# Patient Record
Sex: MALE | Race: WHITE | NOT HISPANIC OR LATINO | Employment: OTHER | URBAN - METROPOLITAN AREA
[De-identification: names, ages, dates, MRNs, and addresses within clinical notes are randomized per-mention and may not be internally consistent; named-entity substitution may affect disease eponyms.]

---

## 2018-07-09 DIAGNOSIS — G31.84 MILD COGNITIVE IMPAIRMENT: Primary | ICD-10-CM

## 2018-07-09 DIAGNOSIS — G31.84 MCI (MILD COGNITIVE IMPAIRMENT) WITH MEMORY LOSS: Primary | ICD-10-CM

## 2018-08-29 ENCOUNTER — HOSPITAL ENCOUNTER (OUTPATIENT)
Dept: RADIOLOGY | Facility: HOSPITAL | Age: 73
Discharge: HOME | End: 2018-08-29
Attending: FAMILY MEDICINE
Payer: MEDICARE

## 2018-08-29 DIAGNOSIS — G31.84 MCI (MILD COGNITIVE IMPAIRMENT) WITH MEMORY LOSS: ICD-10-CM

## 2018-08-29 PROCEDURE — 70470 CT HEAD/BRAIN W/O & W/DYE: CPT | Mod: TC,PO

## 2018-08-29 PROCEDURE — 25500020 PHARM REV CODE 255: Mod: PO

## 2018-08-29 RX ADMIN — IOHEXOL 75 ML: 350 INJECTION, SOLUTION INTRAVENOUS at 02:08

## 2018-12-01 ENCOUNTER — HOSPITAL ENCOUNTER (INPATIENT)
Facility: HOSPITAL | Age: 73
LOS: 1 days | Discharge: HOME | DRG: 065 | End: 2018-12-02
Attending: EMERGENCY MEDICINE | Admitting: PSYCHIATRY & NEUROLOGY
Payer: MEDICARE

## 2018-12-01 ENCOUNTER — HOSPITAL ENCOUNTER (EMERGENCY)
Facility: HOSPITAL | Age: 73
Discharge: STILL ADMITTED OR ADMINISTRATIVE | End: 2018-12-01
Attending: EMERGENCY MEDICINE
Payer: MEDICARE

## 2018-12-01 VITALS
WEIGHT: 188.94 LBS | SYSTOLIC BLOOD PRESSURE: 157 MMHG | BODY MASS INDEX: 25.59 KG/M2 | HEART RATE: 105 BPM | DIASTOLIC BLOOD PRESSURE: 83 MMHG | OXYGEN SATURATION: 97 % | HEIGHT: 72 IN | RESPIRATION RATE: 26 BRPM

## 2018-12-01 DIAGNOSIS — I63.312 CEREBROVASCULAR ACCIDENT (CVA) DUE TO THROMBOSIS OF LEFT MIDDLE CEREBRAL ARTERY: Primary | ICD-10-CM

## 2018-12-01 DIAGNOSIS — I63.9 STROKE: ICD-10-CM

## 2018-12-01 DIAGNOSIS — I63.9 CVA (CEREBRAL VASCULAR ACCIDENT): ICD-10-CM

## 2018-12-01 DIAGNOSIS — I63.9 CEREBROVASCULAR ACCIDENT (CVA), UNSPECIFIED MECHANISM: Primary | ICD-10-CM

## 2018-12-01 PROBLEM — E66.9 OBESITY, UNSPECIFIED: Status: ACTIVE | Noted: 2018-12-01

## 2018-12-01 PROBLEM — I63.412 STROKE DUE TO EMBOLISM OF LEFT MIDDLE CEREBRAL ARTERY: Status: ACTIVE | Noted: 2018-12-01

## 2018-12-01 PROBLEM — E11.9 TYPE 2 DIABETES MELLITUS, WITH LONG-TERM CURRENT USE OF INSULIN: Status: ACTIVE | Noted: 2018-12-01

## 2018-12-01 PROBLEM — Z79.4 TYPE 2 DIABETES MELLITUS, WITH LONG-TERM CURRENT USE OF INSULIN: Status: ACTIVE | Noted: 2018-12-01

## 2018-12-01 PROBLEM — Z92.82 TISSUE PLASMINOGEN ACTIVATOR (T-PA) ADMINISTERED AT OTHER FACILITY WITHIN 24 HOURS PRIOR TO CURRENT ADMISSION: Status: ACTIVE | Noted: 2018-12-01

## 2018-12-01 LAB
ALBUMIN SERPL BCP-MCNC: 4 G/DL
ALP SERPL-CCNC: 50 U/L
ALT SERPL W/O P-5'-P-CCNC: 21 U/L
ANION GAP SERPL CALC-SCNC: 8 MMOL/L
AST SERPL-CCNC: 24 U/L
BASOPHILS # BLD AUTO: 0.04 K/UL
BASOPHILS NFR BLD: 0.6 %
BILIRUB SERPL-MCNC: 0.7 MG/DL
BUN SERPL-MCNC: 18 MG/DL
CALCIUM SERPL-MCNC: 9.3 MG/DL
CHLORIDE SERPL-SCNC: 106 MMOL/L
CHOLEST SERPL-MCNC: 172 MG/DL
CHOLEST SERPL-MCNC: 189 MG/DL
CHOLEST/HDLC SERPL: 4.4 {RATIO}
CHOLEST/HDLC SERPL: 4.6 {RATIO}
CO2 SERPL-SCNC: 26 MMOL/L
CREAT SERPL-MCNC: 1.1 MG/DL
DIFFERENTIAL METHOD: NORMAL
EOSINOPHIL # BLD AUTO: 0.1 K/UL
EOSINOPHIL NFR BLD: 2.2 %
ERYTHROCYTE [DISTWIDTH] IN BLOOD BY AUTOMATED COUNT: 13.1 %
EST. GFR  (AFRICAN AMERICAN): >60 ML/MIN/1.73 M^2
EST. GFR  (NON AFRICAN AMERICAN): >60 ML/MIN/1.73 M^2
ESTIMATED AVG GLUCOSE: 212 MG/DL
GLUCOSE SERPL-MCNC: 149 MG/DL
HBA1C MFR BLD HPLC: 9 %
HCT VFR BLD AUTO: 43.4 %
HDLC SERPL-MCNC: 39 MG/DL
HDLC SERPL-MCNC: 41 MG/DL
HDLC SERPL: 21.7 %
HDLC SERPL: 22.7 %
HGB BLD-MCNC: 15 G/DL
INR PPP: 1.1
LDLC SERPL CALC-MCNC: 104.8 MG/DL
LDLC SERPL CALC-MCNC: 118.2 MG/DL
LYMPHOCYTES # BLD AUTO: 1.8 K/UL
LYMPHOCYTES NFR BLD: 27.4 %
MCH RBC QN AUTO: 30 PG
MCHC RBC AUTO-ENTMCNC: 34.6 G/DL
MCV RBC AUTO: 87 FL
MONOCYTES # BLD AUTO: 0.8 K/UL
MONOCYTES NFR BLD: 12.5 %
NEUTROPHILS # BLD AUTO: 3.7 K/UL
NEUTROPHILS NFR BLD: 57.3 %
NONHDLC SERPL-MCNC: 133 MG/DL
NONHDLC SERPL-MCNC: 148 MG/DL
PLATELET # BLD AUTO: 196 K/UL
PMV BLD AUTO: 11 FL
POCT GLUCOSE: 95 MG/DL (ref 70–110)
POCT GLUCOSE: 97 MG/DL (ref 70–110)
POTASSIUM SERPL-SCNC: 4.4 MMOL/L
PROT SERPL-MCNC: 6.8 G/DL
PROTHROMBIN TIME: 12.1 SEC
RBC # BLD AUTO: 5 M/UL
SODIUM SERPL-SCNC: 140 MMOL/L
TRIGL SERPL-MCNC: 141 MG/DL
TRIGL SERPL-MCNC: 149 MG/DL
TSH SERPL DL<=0.005 MIU/L-ACNC: 1.44 UIU/ML
TSH SERPL DL<=0.005 MIU/L-ACNC: 2.41 UIU/ML
WBC # BLD AUTO: 6.46 K/UL

## 2018-12-01 PROCEDURE — 96374 THER/PROPH/DIAG INJ IV PUSH: CPT

## 2018-12-01 PROCEDURE — 99223 1ST HOSP IP/OBS HIGH 75: CPT | Mod: ,,, | Performed by: PSYCHIATRY & NEUROLOGY

## 2018-12-01 PROCEDURE — 99285 EMERGENCY DEPT VISIT HI MDM: CPT | Mod: 25

## 2018-12-01 PROCEDURE — 99291 CRITICAL CARE FIRST HOUR: CPT | Mod: 25

## 2018-12-01 PROCEDURE — 82962 GLUCOSE BLOOD TEST: CPT

## 2018-12-01 PROCEDURE — 94760 N-INVAS EAR/PLS OXIMETRY 1: CPT

## 2018-12-01 PROCEDURE — 85610 PROTHROMBIN TIME: CPT

## 2018-12-01 PROCEDURE — 85025 COMPLETE CBC W/AUTO DIFF WBC: CPT

## 2018-12-01 PROCEDURE — 93010 ELECTROCARDIOGRAM REPORT: CPT | Mod: ,,, | Performed by: INTERNAL MEDICINE

## 2018-12-01 PROCEDURE — 93005 ELECTROCARDIOGRAM TRACING: CPT

## 2018-12-01 PROCEDURE — 83036 HEMOGLOBIN GLYCOSYLATED A1C: CPT

## 2018-12-01 PROCEDURE — 63600175 PHARM REV CODE 636 W HCPCS: Performed by: NURSE PRACTITIONER

## 2018-12-01 PROCEDURE — 96375 TX/PRO/DX INJ NEW DRUG ADDON: CPT

## 2018-12-01 PROCEDURE — 20000000 HC ICU ROOM

## 2018-12-01 PROCEDURE — 80053 COMPREHEN METABOLIC PANEL: CPT

## 2018-12-01 PROCEDURE — 25000003 PHARM REV CODE 250: Performed by: EMERGENCY MEDICINE

## 2018-12-01 PROCEDURE — 25500020 PHARM REV CODE 255: Performed by: EMERGENCY MEDICINE

## 2018-12-01 PROCEDURE — 63600175 PHARM REV CODE 636 W HCPCS: Performed by: EMERGENCY MEDICINE

## 2018-12-01 PROCEDURE — 86850 RBC ANTIBODY SCREEN: CPT

## 2018-12-01 PROCEDURE — 80061 LIPID PANEL: CPT

## 2018-12-01 PROCEDURE — 99291 CRITICAL CARE FIRST HOUR: CPT | Mod: ,,, | Performed by: EMERGENCY MEDICINE

## 2018-12-01 PROCEDURE — G0427 INPT/ED TELECONSULT70: HCPCS | Mod: GT,,, | Performed by: PSYCHIATRY & NEUROLOGY

## 2018-12-01 PROCEDURE — 80061 LIPID PANEL: CPT | Mod: 91

## 2018-12-01 PROCEDURE — 84443 ASSAY THYROID STIM HORMONE: CPT | Mod: 91

## 2018-12-01 PROCEDURE — 25000003 PHARM REV CODE 250: Performed by: NURSE PRACTITIONER

## 2018-12-01 PROCEDURE — 84443 ASSAY THYROID STIM HORMONE: CPT

## 2018-12-01 RX ORDER — GLUCAGON 1 MG
1 KIT INJECTION
Status: DISCONTINUED | OUTPATIENT
Start: 2018-12-01 | End: 2018-12-02 | Stop reason: HOSPADM

## 2018-12-01 RX ORDER — IBUPROFEN 200 MG
16 TABLET ORAL
Status: DISCONTINUED | OUTPATIENT
Start: 2018-12-01 | End: 2018-12-02 | Stop reason: HOSPADM

## 2018-12-01 RX ORDER — AMOXICILLIN 250 MG
1 CAPSULE ORAL DAILY
Status: DISCONTINUED | OUTPATIENT
Start: 2018-12-02 | End: 2018-12-02 | Stop reason: HOSPADM

## 2018-12-01 RX ORDER — SODIUM CHLORIDE 0.9 % (FLUSH) 0.9 %
3 SYRINGE (ML) INJECTION EVERY 8 HOURS
Status: DISCONTINUED | OUTPATIENT
Start: 2018-12-01 | End: 2018-12-02 | Stop reason: HOSPADM

## 2018-12-01 RX ORDER — ATORVASTATIN CALCIUM 20 MG/1
40 TABLET, FILM COATED ORAL DAILY
Status: DISCONTINUED | OUTPATIENT
Start: 2018-12-02 | End: 2018-12-02 | Stop reason: HOSPADM

## 2018-12-01 RX ORDER — HYDRALAZINE HYDROCHLORIDE 20 MG/ML
10 INJECTION INTRAMUSCULAR; INTRAVENOUS
Status: COMPLETED | OUTPATIENT
Start: 2018-12-01 | End: 2018-12-01

## 2018-12-01 RX ORDER — ACETAMINOPHEN 325 MG/1
650 TABLET ORAL EVERY 6 HOURS PRN
Status: DISCONTINUED | OUTPATIENT
Start: 2018-12-01 | End: 2018-12-02 | Stop reason: HOSPADM

## 2018-12-01 RX ORDER — IBUPROFEN 200 MG
24 TABLET ORAL
Status: DISCONTINUED | OUTPATIENT
Start: 2018-12-01 | End: 2018-12-02 | Stop reason: HOSPADM

## 2018-12-01 RX ORDER — SODIUM CHLORIDE 9 MG/ML
INJECTION, SOLUTION INTRAVENOUS CONTINUOUS
Status: DISCONTINUED | OUTPATIENT
Start: 2018-12-01 | End: 2018-12-02

## 2018-12-01 RX ORDER — HYDRALAZINE HYDROCHLORIDE 20 MG/ML
10 INJECTION INTRAMUSCULAR; INTRAVENOUS EVERY 4 HOURS PRN
Status: DISCONTINUED | OUTPATIENT
Start: 2018-12-01 | End: 2018-12-02 | Stop reason: HOSPADM

## 2018-12-01 RX ORDER — INSULIN ASPART 100 [IU]/ML
1-10 INJECTION, SOLUTION INTRAVENOUS; SUBCUTANEOUS
Status: DISCONTINUED | OUTPATIENT
Start: 2018-12-01 | End: 2018-12-02 | Stop reason: HOSPADM

## 2018-12-01 RX ORDER — NICARDIPINE HYDROCHLORIDE 0.2 MG/ML
2.5 INJECTION INTRAVENOUS CONTINUOUS
Status: DISCONTINUED | OUTPATIENT
Start: 2018-12-01 | End: 2018-12-01 | Stop reason: HOSPADM

## 2018-12-01 RX ADMIN — HYDRALAZINE HYDROCHLORIDE 10 MG: 20 INJECTION INTRAMUSCULAR; INTRAVENOUS at 11:12

## 2018-12-01 RX ADMIN — NICARDIPINE HYDROCHLORIDE 2.5 MG/HR: 0.2 INJECTION, SOLUTION INTRAVENOUS at 06:12

## 2018-12-01 RX ADMIN — SODIUM CHLORIDE: 0.9 INJECTION, SOLUTION INTRAVENOUS at 10:12

## 2018-12-01 RX ADMIN — IOHEXOL 100 ML: 350 INJECTION, SOLUTION INTRAVENOUS at 07:12

## 2018-12-01 RX ADMIN — ALTEPLASE 7.6 MG: 2.2 INJECTION, POWDER, LYOPHILIZED, FOR SOLUTION INTRAVENOUS at 05:12

## 2018-12-01 RX ADMIN — HYDRALAZINE HYDROCHLORIDE 10 MG: 20 INJECTION INTRAMUSCULAR; INTRAVENOUS at 05:12

## 2018-12-01 RX ADMIN — ALTEPLASE 69 MG: KIT at 05:12

## 2018-12-01 NOTE — SUBJECTIVE & OBJECTIVE
Woke up with symptoms?: no  Last known normal: Last Known Normal Date: 12/01/18 Last Known Normal Time: 1530    Recent bleeding noted: no  Does the patient take any Blood Thinners? no  Medications: No relevant medications      Past Medical History: hypertension and diabetes    Past Surgical History: no major surgeries within the last 2 weeks    Family History: no relevant history    Social History: no smoking, no drinking, no drugs    Allergies: Tetanus Vaccines And Toxoid     Review of Systems   Neurological: Positive for facial asymmetry and speech difficulty.   All other systems reviewed and are negative.    Objective:   Vitals: Blood pressure (!) 196/103, pulse 99, resp. rate 14, height 6' (1.829 m), weight 85.7 kg (188 lb 15 oz), SpO2 97 %.     CT READ: Yes  No hemmorhage. No mass effect. No early infarct signs.     Physical Exam   Constitutional: He appears well-developed and well-nourished.   HENT:   Head: Normocephalic and atraumatic.   Eyes: EOM are normal. Pupils are equal, round, and reactive to light.   Cardiovascular: Normal rate and regular rhythm.   Pulmonary/Chest: Effort normal.   Neurological: He is alert. A cranial nerve deficit is present.   Vitals reviewed.

## 2018-12-01 NOTE — ED TRIAGE NOTES
Family reports 15 mins prior to arrival pt was slurring his words, left facial sroop and unable to walk steady

## 2018-12-01 NOTE — CONSULTS
[facility]  Vascular Neurology  Comprehensive Stroke Center  Tele-Consultation Note      Inpatient consult to Stroke Telemedicine  Consult performed by: Nessa Lobo MD  Consult ordered by: Migel Jameson MD  Reason for consult: stroke  Assessment/Recommendations: Stroke due to embolism of left middle cerebral artery          Consulting Provider: Spoke Physician:: Dr. Migel Jameson  Current Providers  No providers found    Patient Location: [facility]  Spoke hospital nurse at bedside with patient assisting consultant.     Patient information was obtained from relative(s).       Assessment/Plan:     STROKE DOCUMENTATION     Acute Stroke Times:   Acute Stroke Times   Last Known Normal Date: 12/01/18  Last Known Normal Time: 1530  Symptom Onset Date: 12/01/18  Symptom Onset Time: 1530  Stroke Team Called Date: 12/01/18  Stroke Team Called Time: 1508  Stroke Team Arrival Date: 12/01/18  Stroke Team Arrival Time: 1626  CT Interpretation Time: 1635  Decision to Treat Time for Alteplase: 1635    NIH Scale:  Interval: baseline (upon arrival/admit)  1a. Level Of Consciousness: 0-->Alert: keenly responsive  1b. LOC Questions: 2-->Answers neither question correctly  1c. LOC Commands: 2-->Performs neither task correctly  2. Best Gaze: 0-->Normal  3. Visual: 0-->No visual loss  4. Facial Palsy: 2-->Partial paralysis (total or near-total paralysis of lower face)  5a. Motor Arm, Left: 0-->No drift: limb holds 90 (or 45) degrees for full 10 secs  5b. Motor Arm, Right: 0-->No drift: limb holds 90 (or 45) degrees for full 10 secs  6a. Motor Leg, Left: 0-->No drift: leg holds 30 degree position for full 5 secs  6b. Motor Leg, Right: 0-->No drift: leg holds 30 degree position for full 5 secs  7. Limb Ataxia: 0-->Absent  8. Sensory: 0-->Normal: no sensory loss  9. Best Language: 2-->Severe aphasia: all communication is through fragmentary expression:  great need for inference, questioning, and guessing by the listener. Range of information that can be exchanged is limited: listener carries burden of. . . (see row details)  10. Dysarthria: 2-->Severe dysarthria: patients speech is so slurred as to be unintelligible in the absence of or out of proportion to any dysphasia, or is mute/anarthric  11. Extinction and Inattention (formerly Neglect): 0-->No abnormality  Total (NIH Stroke Scale): 10     Modified [place] Score: 0  Vidhi Coma Scale:    ABCD2 Score:    [id number] Score:   HAS -BLED Score:   ICH Score:   Hunt & Sung Classification:       Diagnoses:   Stroke due to embolism of left middle cerebral artery    Stroke due to embolism of left middle cerebral artery  Antithrombotics for secondary stroke prevention: Antiplatelets: None: Hold all Antithrombotics x 24 hours after IV t-PA administration    Statins for secondary stroke prevention and hyperlipidemia, if present:   Statins: Atorvastatin- 80 mg daily    Aggressive risk factor modification: HTN, DM, HLD     Rehab efforts: PT/OT/SLP to evaluate and treat    Diagnostics ordered/pending: CTA Head to assess vasculature , CTA Neck/Arch to assess vasculature, HgbA1C to assess blood glucose levels, Lipid Profile to assess cholesterol levels, MRI head without contrast to assess brain parenchyma, TTE to assess cardiac function/status     VTE prophylaxis: None: Reason for No Pharmacological VTE Prophylaxis: Mechanical prophylaxis: Place SCDs    BP parameters: Infarct: Post tPA, SBP <180             Blood pressure (!) 196/103, pulse 99, resp. rate 14, height 6' (1.829 m), weight 85.7 kg (188 lb 15 oz), SpO2 97 %.  Alteplase Eligible?: Yes  Alteplase Recommendation:   Alteplase Total Dose: 76.5 mg   Total dose: Alteplase 0.9mg/kg (max dose:90mg)                      ** based on acquired weight from facility   Bolus Dose: 7.6 mg   10% of total Alteplase dose given intravenously over 1 minute   Continuous Infusion Dose:  76.5 mg   Remaining 90% of total Alteplase dose infused intravenously over 60 minutes    **infusion must start at the same time as the bolus dose     Additional Recommendations:   1. Neurological assessment and vital signs (except temperature) every 15 minutes during Altaplase infusion.  2. Frequency of BP assessments may need to be increased if systolic BP stays >= 180 mm Hg or diastolic BP stays >= 105 mm Hg. Administer antihypertensive meds as ordered  3. Continue to monitor and control blood pressure and monitor for neurological deterioration every 15 minutes for the first hour after the infusion is stopped. Then every 30 minutes for the next 6 hours. Perform hourly monitoring from the 8th post-infusion hour until 24 hours post-infusion.  4. Temperature every 4 hours or as required.  5. Follow hospital protocol for further orders re: post tPA infusion patient management.  6. No antithrombotics or anticoagulants (including but not limited to: heparin, warfarin, aspirin, clopidigrel, or dipyridamole) for 24 hours, then start antithrombotics as ordered by treating physician    Adapted from the American Heart Association/American Stroke Association (AHA/ASA) and American Association of Neuroscience Nurses (AANN) Guidelines.   Possible Interventional Revascularization Candidate? Yes    Disposition Recommendation: transfer to system sub-[facility] by  ground  stat      Subjective:     History of Present Illness:  73 y/o male LKN at 1530 who developed aphasia and right facial weakness while talking to his daughter. Denies weakness, numbness, ataxia or visual changes.        Woke up with symptoms?: no  Last known normal: Last Known Normal Date: 12/01/18 Last Known Normal Time: 1530    Recent bleeding noted: no  Does the patient take any Blood Thinners? no  Medications: No relevant medications      Past Medical History: hypertension and diabetes    Past Surgical History: no major surgeries within the last 2  weeks    Family History: no relevant history    Social History: no smoking, no drinking, no drugs    Allergies: Tetanus Vaccines And Toxoid     Review of Systems   Neurological: Positive for facial asymmetry and speech difficulty.   All other systems reviewed and are negative.    Objective:   Vitals: Blood pressure (!) 196/103, pulse 99, resp. rate 14, height 6' (1.829 m), weight 85.7 kg (188 lb 15 oz), SpO2 97 %.     CT READ: Yes  No hemmorhage. No mass effect. No early infarct signs.     Physical Exam   Constitutional: He appears well-developed and well-nourished.   HENT:   Head: Normocephalic and atraumatic.   Eyes: EOM are normal. Pupils are equal, round, and reactive to light.   Cardiovascular: Normal rate and regular rhythm.   Pulmonary/Chest: Effort normal.   Neurological: He is alert. A cranial nerve deficit is present.   Vitals reviewed.            Recommended the emergency room physician to have a brief discussion with the patient and/or family if available regarding the risks and benefits of treatment, and to briefly document the occurrence of that discussion in his clinical encounter note.     The attending portion of this evaluation, treatment, and documentation was performed per Nessa Lobo MD via audiovisual.    Billing code:  (time dependent stroke, complex case, unstable patient, hemorrhages, any intervention, some mimics)    · This patient has a very critical neurological condition/illness, with very high morbidity and mortality.  · There is a very high probability for acute neurological change leading to clinical and possibly life-threatening deterioration requiring highest level of physician preparedness for urgent intervention.  · There is possibility that this condition will require treatment with high risk medications as quickly as possible.  · There is also a possibility that the patient may benefit from further, more advance complex therapies (e.g. endovascular therapy) that will  require prompt diagnosis and care.  · Care was coordinated with other physicians involved in the patient's care.  · Radiologic studies and laboratory data were reviewed and interpreted, and plan of care was re-assessed based on the results.  · Diagnosis, treatment options and prognosis may have been discussed with the patient and/or family members or caregiver.  · Further advanced medical management and further evaluation is warranted for his care.      In your opinion, this was a: Tier 1    Consult End Time: 5:15 PM     Nessa Lobo MD  [facility]  Vascular Neurology   [facility]

## 2018-12-01 NOTE — ED PROVIDER NOTES
Encounter Date: 12/1/2018       History   No chief complaint on file.      12/01/2018  4:00 PM    Chief Complaint:  72-year-old male presents with new onset slurred speech and facial droop.  Family reports talking to patient and sudden onset of slurred speech and confusion.  Family also noticed a change in the left side of his face.  Family also complains that patient is having at difficulty walking at this time.  Blood sugar was 179 on arrival.  Symptoms started approximately 20 min prior to arrival according to family.  This puts time of onset at 15 40.      Denies chest pain/shortness of breath/fever/chills/nausea/vomiting    Patient has a past medical history of Basal cell carcinoma and Diabetes mellitus.  Patient has a past surgical history that includes amputation right great toe; REVISION, PROCEDURE INVOLVING FLAP GRAFT (Left, 12/18/2013); APPLICATION, GRAFT, SKIN, FULL-THICKNESS (N/A, 12/18/2013); AURICULECTOMY (Left, 11/27/2013); and RECONSTRUCTION USING FLAP (Left, 11/27/2013).          Review of patient's allergies indicates:   Allergen Reactions    Tetanus vaccines and toxoid      Past Medical History:   Diagnosis Date    Basal cell carcinoma     Diabetes mellitus      Past Surgical History:   Procedure Laterality Date    amputation right great toe      APPLICATION, GRAFT, SKIN, FULL-THICKNESS N/A 12/18/2013    [facility]    AURICULECTOMY Left 11/27/2013    [facility]    RECONSTRUCTION USING FLAP Left 11/27/2013    Per[facility]    REVISION, PROCEDURE INVOLVING FLAP GRAFT Left 12/18/2013    Performed by [facility] at [facility] [place]     No family history on file.  Social History     Tobacco Use    Smoking status: Never Smoker   Substance Use Topics    Alcohol use: Yes     Alcohol/week: 0.5 oz     Types: 1 drink(s) per week    Drug use: Not on file      Review of Systems   All other systems reviewed and are negative.      Physical Exam     Initial Vitals   BP Pulse Resp Temp SpO2   -- -- -- -- --      MAP       --         Physical Exam    Nursing note and vitals reviewed.  Constitutional: He appears well-developed.   HENT:   Head: Normocephalic and atraumatic.   Right Ear: External ear normal.   Left Ear: External ear normal.   Nose: Nose normal.   Mouth/Throat: Oropharynx is clear and moist.   Positive left facial droop, slight.  Positive slurred speech. Patient slow to answer questions   Eyes: Conjunctivae and EOM are normal. Pupils are equal, round, and reactive to light.   Neck: Normal range of motion. Neck supple.   Cardiovascular: Normal rate, regular rhythm and normal heart sounds.   Pulmonary/Chest: Breath sounds normal.   Abdominal: Soft. Bowel sounds are normal.   Neurological: He is alert. GCS score is 15. GCS eye subscore is 4. GCS verbal subscore is 5. GCS motor subscore is 6.   Skin: Skin is warm. Capillary refill takes less than 2 seconds.         ED Course   Procedures  Labs Reviewed - No data to display       Imaging Results    None              Results for orders placed or performed during the hospital encounter of 12/01/18   CBC W/ AUTO DIFFERENTIAL   Result Value Ref Range    WBC 6.46 3.90 - 12.70 K/uL    RBC 5.00 4.60 - 6.20 M/uL    Hemoglobin 15.0 14.0 - 18.0 g/dL    Hematocrit 43.4 40.0 - 54.0 %    MCV 87 82 - 98 fL    MCH 30.0 27.0 - 31.0 pg    MCHC 34.6 32.0 - 36.0 g/dL    RDW 13.1 11.5 - 14.5 %    Platelets 196 150 - 350 K/uL    MPV 11.0 9.2 - 12.9 fL    Gran # (ANC) 3.7 1.8 - 7.7 K/uL    Lymph # 1.8 1.0 - 4.8 K/uL    Mono # 0.8 0.3 - 1.0 K/uL    Eos # 0.1 0.0 - 0.5 K/uL    Baso # 0.04 0.00 - 0.20 K/uL    Gran% 57.3 38.0 - 73.0 %    Lymph% 27.4 18.0 - 48.0 %    Mono% 12.5 4.0 - 15.0 %    Eosinophil% 2.2 0.0 - 8.0 %    Basophil% 0.6 0.0 - 1.9 %    Differential Method Automated    Comprehensive metabolic panel   Result Value Ref Range     Sodium 140 136 - 145 mmol/L    Potassium 4.4 3.5 - 5.1 mmol/L    Chloride 106 95 - 110 mmol/L    CO2 26 23 - 29 mmol/L    Glucose 149 (H) 70 - 110 mg/dL    BUN, Bld 18 2 - 20 mg/dL    Creatinine 1.10 0.50 - 1.40 mg/dL    Calcium 9.3 8.7 - 10.5 mg/dL    Total Protein 6.8 6.0 - 8.4 g/dL    Albumin 4.0 3.5 - 5.2 g/dL    Total Bilirubin 0.7 0.1 - 1.0 mg/dL    Alkaline Phosphatase 50 38 - 126 U/L    AST 24 15 - 46 U/L    ALT 21 10 - 44 U/L    Anion Gap 8 8 - 16 mmol/L    eGFR if African American >60.0 >60 mL/min/1.73 m^2    eGFR if non African American >60.0 >60 mL/min/1.73 m^2   Protime-INR   Result Value Ref Range    Prothrombin Time 12.1 9.0 - 12.5 sec    INR 1.1 0.8 - 1.2   TSH   Result Value Ref Range    TSH 2.410 0.400 - 4.000 uIU/mL           Imaging Results          X-Ray Chest AP Portable (Final result)  Result time 12/01/18 16:39:46    Final result by Solitario Whitman MD (12/01/18 16:39:46)                 Impression:      Left basilar infiltrate with possible left pleural fluid collection.      Electronically signed by: Solitario Whitman MD  Date:    12/01/2018  Time:    16:39             Narrative:    EXAMINATION:  XR CHEST AP PORTABLE    CLINICAL HISTORY:  Stroke;    COMPARISON:  11/19/2013    FINDINGS:  The heart is normal in size.  The aorta is atherosclerotic.  Calcified granuloma right upper lobe.  Infiltrate left lung base with possible left pleural fluid collection.                               CT Head Without Contrast (Final result)  Result time 12/01/18 16:41:11    Final result by Solitario Whitman MD (12/01/18 16:41:11)                 Impression:      No acute findings.    All CT scans at this facility are performed  using dose modulation techniques as appropriate to performed exam including the following:  automated exposure control; adjustment of mA and/or kV according to the patients size (this includes techniques or standardized protocols for targeted exams where dose is matched to indication/reason for  exam: i.e. extremities or head);  iterative reconstruction technique.      Electronically signed by: Solitario Whitman MD  Date:    12/01/2018  Time:    16:41             Narrative:    EXAMINATION:  CT HEAD WITHOUT CONTRAST    CLINICAL HISTORY:  CVA    COMPARISON:  08/29/2018    FINDINGS:  No intracranial hemorrhage or acute findings are demonstrated.  Cerebral atrophy with white matter changes.  Lacunar infarct in the left periventricular white matter unchanged compared to 08/29/2018.  The visualized paranasal sinuses are clear. The calvarium is intact.                                               Clinical Impression:   The primary encounter diagnosis was Cerebrovascular accident (CVA) due to thrombosis of left middle cerebral artery. A diagnosis of Stroke was also pertinent to this visit.                             Migel Jameson MD  12/04/18 0300

## 2018-12-01 NOTE — ASSESSMENT & PLAN NOTE
Stroke due to embolism of left middle cerebral artery  Antithrombotics for secondary stroke prevention: Antiplatelets: None: Hold all Antithrombotics x 24 hours after IV t-PA administration    Statins for secondary stroke prevention and hyperlipidemia, if present:   Statins: Atorvastatin- 80 mg daily    Aggressive risk factor modification: HTN, DM, HLD     Rehab efforts: PT/OT/SLP to evaluate and treat    Diagnostics ordered/pending: CTA Head to assess vasculature , CTA Neck/Arch to assess vasculature, HgbA1C to assess blood glucose levels, Lipid Profile to assess cholesterol levels, MRI head without contrast to assess brain parenchyma, TTE to assess cardiac function/status     VTE prophylaxis: None: Reason for No Pharmacological VTE Prophylaxis: Mechanical prophylaxis: Place SCDs    BP parameters: Infarct: Post tPA, SBP <180

## 2018-12-01 NOTE — HPI
71 y/o male LKN at 1530 who developed aphasia and right facial weakness while talking to his daughter. Denies weakness, numbness, ataxia or visual changes.

## 2018-12-01 NOTE — ED NOTES
Estelle from [facility] notified of tele stroke consult  Estelle notified Dr. Lobo at [phone]

## 2018-12-02 VITALS
DIASTOLIC BLOOD PRESSURE: 100 MMHG | RESPIRATION RATE: 23 BRPM | TEMPERATURE: 99 F | WEIGHT: 186 LBS | BODY MASS INDEX: 29.19 KG/M2 | OXYGEN SATURATION: 99 % | HEART RATE: 108 BPM | HEIGHT: 67 IN | SYSTOLIC BLOOD PRESSURE: 194 MMHG

## 2018-12-02 PROBLEM — G45.9 TRANSIENT ISCHEMIC ATTACK (TIA): Status: ACTIVE | Noted: 2018-12-01

## 2018-12-02 LAB
ABO + RH BLD: NORMAL
ALBUMIN SERPL BCP-MCNC: 3.2 G/DL
ALP SERPL-CCNC: 45 U/L
ALT SERPL W/O P-5'-P-CCNC: 13 U/L
ANION GAP SERPL CALC-SCNC: 8 MMOL/L
ASCENDING AORTA: 3.07 CM
AST SERPL-CCNC: 9 U/L
AV INDEX (PROSTH): 0.87
AV MEAN GRADIENT: 3.12 MMHG
AV PEAK GRADIENT: 4.75 MMHG
AV VALVE AREA: 3.07 CM2
BASOPHILS # BLD AUTO: 0.04 K/UL
BASOPHILS NFR BLD: 0.5 %
BILIRUB SERPL-MCNC: 0.4 MG/DL
BLD GP AB SCN CELLS X3 SERPL QL: NORMAL
BSA FOR ECHO PROCEDURE: 2 M2
BUN SERPL-MCNC: 15 MG/DL
CALCIUM SERPL-MCNC: 8.5 MG/DL
CHLORIDE SERPL-SCNC: 109 MMOL/L
CO2 SERPL-SCNC: 25 MMOL/L
CREAT SERPL-MCNC: 1 MG/DL
CV ECHO LV RWT: 0.47 CM
DIFFERENTIAL METHOD: ABNORMAL
DOP CALC AO PEAK VEL: 1.09 M/S
DOP CALC AO VTI: 19.46 CM
DOP CALC LVOT AREA: 3.53 CM2
DOP CALC LVOT DIAMETER: 2.12 CM
DOP CALC LVOT STROKE VOLUME: 59.66 CM3
DOP CALCLVOT PEAK VEL VTI: 16.91 CM
E/E' RATIO: 10.48
ECHO LV POSTERIOR WALL: 0.99 CM (ref 0.6–1.1)
EOSINOPHIL # BLD AUTO: 0.1 K/UL
EOSINOPHIL NFR BLD: 0.6 %
ERYTHROCYTE [DISTWIDTH] IN BLOOD BY AUTOMATED COUNT: 12.7 %
EST. GFR  (AFRICAN AMERICAN): >60 ML/MIN/1.73 M^2
EST. GFR  (NON AFRICAN AMERICAN): >60 ML/MIN/1.73 M^2
FRACTIONAL SHORTENING: 45 % (ref 28–44)
GLUCOSE SERPL-MCNC: 149 MG/DL
HCT VFR BLD AUTO: 39.5 %
HGB BLD-MCNC: 13.8 G/DL
IMM GRANULOCYTES # BLD AUTO: 0.02 K/UL
IMM GRANULOCYTES NFR BLD AUTO: 0.3 %
INR PPP: 1.1
INTERVENTRICULAR SEPTUM: 0.88 CM (ref 0.6–1.1)
IVRT: 0.06 MSEC
LA MAJOR: 4.4 CM
LA MINOR: 4.4 CM
LA WIDTH: 4.3 CM
LEFT ATRIUM SIZE: 4.4 CM
LEFT ATRIUM VOLUME INDEX: 35.4 ML/M2
LEFT ATRIUM VOLUME: 70.76 CM3
LEFT INTERNAL DIMENSION IN SYSTOLE: 2.32 CM (ref 2.1–4)
LEFT VENTRICLE DIASTOLIC VOLUME INDEX: 19.23 ML/M2
LEFT VENTRICLE DIASTOLIC VOLUME: 38.46 ML
LEFT VENTRICLE MASS INDEX: 62.5 G/M2
LEFT VENTRICLE SYSTOLIC VOLUME INDEX: 9.2 ML/M2
LEFT VENTRICLE SYSTOLIC VOLUME: 18.49 ML
LEFT VENTRICULAR INTERNAL DIMENSION IN DIASTOLE: 4.2 CM (ref 3.5–6)
LEFT VENTRICULAR MASS: 125.03 G
LV LATERAL E/E' RATIO: 10.08
LV SEPTAL E/E' RATIO: 10.92
LYMPHOCYTES # BLD AUTO: 1.3 K/UL
LYMPHOCYTES NFR BLD: 16.5 %
MAGNESIUM SERPL-MCNC: 2 MG/DL
MCH RBC QN AUTO: 30.9 PG
MCHC RBC AUTO-ENTMCNC: 34.9 G/DL
MCV RBC AUTO: 89 FL
MONOCYTES # BLD AUTO: 0.8 K/UL
MONOCYTES NFR BLD: 10.3 %
MV PEAK E VEL: 1.31 M/S
NEUTROPHILS # BLD AUTO: 5.6 K/UL
NEUTROPHILS NFR BLD: 71.8 %
NRBC BLD-RTO: 0 /100 WBC
PHOSPHATE SERPL-MCNC: 3.4 MG/DL
PLATELET # BLD AUTO: 194 K/UL
PMV BLD AUTO: 11.2 FL
POCT GLUCOSE: 101 MG/DL (ref 70–110)
POCT GLUCOSE: 133 MG/DL (ref 70–110)
POTASSIUM SERPL-SCNC: 3.8 MMOL/L
PROT SERPL-MCNC: 5.6 G/DL
PROTHROMBIN TIME: 11 SEC
PULM VEIN S/D RATIO: 1.07
PV PEAK D VEL: 0.29 M/S
PV PEAK S VEL: 0.31 M/S
RA MAJOR: 4.27 CM
RA PRESSURE: 3 MMHG
RA WIDTH: 3.36 CM
RBC # BLD AUTO: 4.46 M/UL
RIGHT VENTRICULAR END-DIASTOLIC DIMENSION: 3.81 CM
RV TISSUE DOPPLER FREE WALL SYSTOLIC VELOCITY 1 (APICAL 4 CHAMBER VIEW): 11.14 M/S
SINUS: 3.06 CM
SODIUM SERPL-SCNC: 142 MMOL/L
STJ: 2.76 CM
TDI LATERAL: 0.13
TDI SEPTAL: 0.12
TDI: 0.13
TRICUSPID ANNULAR PLANE SYSTOLIC EXCURSION: 1.89 CM
WBC # BLD AUTO: 7.77 K/UL

## 2018-12-02 PROCEDURE — 63600175 PHARM REV CODE 636 W HCPCS: Performed by: NURSE PRACTITIONER

## 2018-12-02 PROCEDURE — 97162 PT EVAL MOD COMPLEX 30 MIN: CPT

## 2018-12-02 PROCEDURE — 99233 SBSQ HOSP IP/OBS HIGH 50: CPT | Mod: ,,, | Performed by: PHYSICIAN ASSISTANT

## 2018-12-02 PROCEDURE — 99239 HOSP IP/OBS DSCHRG MGMT >30: CPT | Mod: ,,, | Performed by: PSYCHIATRY & NEUROLOGY

## 2018-12-02 PROCEDURE — 25000003 PHARM REV CODE 250: Performed by: NURSE PRACTITIONER

## 2018-12-02 PROCEDURE — G8980 MOBILITY D/C STATUS: HCPCS | Mod: CJ

## 2018-12-02 PROCEDURE — G8978 MOBILITY CURRENT STATUS: HCPCS | Mod: CJ

## 2018-12-02 PROCEDURE — 80053 COMPREHEN METABOLIC PANEL: CPT

## 2018-12-02 PROCEDURE — 63600175 PHARM REV CODE 636 W HCPCS

## 2018-12-02 PROCEDURE — 84100 ASSAY OF PHOSPHORUS: CPT

## 2018-12-02 PROCEDURE — 92610 EVALUATE SWALLOWING FUNCTION: CPT

## 2018-12-02 PROCEDURE — 83735 ASSAY OF MAGNESIUM: CPT

## 2018-12-02 PROCEDURE — 94761 N-INVAS EAR/PLS OXIMETRY MLT: CPT

## 2018-12-02 PROCEDURE — 85610 PROTHROMBIN TIME: CPT

## 2018-12-02 PROCEDURE — 85025 COMPLETE CBC W/AUTO DIFF WBC: CPT

## 2018-12-02 PROCEDURE — A4216 STERILE WATER/SALINE, 10 ML: HCPCS | Performed by: NURSE PRACTITIONER

## 2018-12-02 PROCEDURE — 25000003 PHARM REV CODE 250: Performed by: PHYSICIAN ASSISTANT

## 2018-12-02 PROCEDURE — G8979 MOBILITY GOAL STATUS: HCPCS | Mod: CI

## 2018-12-02 RX ORDER — LISINOPRIL 5 MG/1
5 TABLET ORAL DAILY
Qty: 90 TABLET | Refills: 3 | Status: SHIPPED | OUTPATIENT
Start: 2018-12-03 | End: 2021-08-13

## 2018-12-02 RX ORDER — MIDAZOLAM HYDROCHLORIDE 1 MG/ML
INJECTION INTRAMUSCULAR; INTRAVENOUS
Status: DISCONTINUED
Start: 2018-12-02 | End: 2018-12-02 | Stop reason: HOSPADM

## 2018-12-02 RX ORDER — ONDANSETRON 2 MG/ML
INJECTION INTRAMUSCULAR; INTRAVENOUS
Status: COMPLETED
Start: 2018-12-02 | End: 2018-12-02

## 2018-12-02 RX ORDER — ONDANSETRON 2 MG/ML
4 INJECTION INTRAMUSCULAR; INTRAVENOUS EVERY 6 HOURS PRN
Status: DISCONTINUED | OUTPATIENT
Start: 2018-12-02 | End: 2018-12-02 | Stop reason: HOSPADM

## 2018-12-02 RX ORDER — MIDAZOLAM HYDROCHLORIDE 1 MG/ML
1 INJECTION INTRAMUSCULAR; INTRAVENOUS EVERY 5 MIN PRN
Status: DISCONTINUED | OUTPATIENT
Start: 2018-12-02 | End: 2018-12-02 | Stop reason: HOSPADM

## 2018-12-02 RX ORDER — ATORVASTATIN CALCIUM 40 MG/1
40 TABLET, FILM COATED ORAL DAILY
Qty: 90 TABLET | Refills: 3 | Status: SHIPPED | OUTPATIENT
Start: 2018-12-03 | End: 2021-08-13 | Stop reason: DRUGHIGH

## 2018-12-02 RX ORDER — LISINOPRIL 5 MG/1
5 TABLET ORAL DAILY
Status: DISCONTINUED | OUTPATIENT
Start: 2018-12-02 | End: 2018-12-02 | Stop reason: HOSPADM

## 2018-12-02 RX ORDER — GLIMEPIRIDE 2 MG/1
2 TABLET ORAL
Qty: 90 TABLET | Refills: 3 | Status: SHIPPED | OUTPATIENT
Start: 2018-12-02 | End: 2018-12-03

## 2018-12-02 RX ORDER — ASPIRIN 81 MG/1
81 TABLET ORAL DAILY
Qty: 30 TABLET | Refills: 3 | COMMUNITY
Start: 2018-12-02 | End: 2022-01-19

## 2018-12-02 RX ORDER — CLOPIDOGREL BISULFATE 75 MG/1
75 TABLET ORAL DAILY
Qty: 30 TABLET | Refills: 0 | Status: SHIPPED | OUTPATIENT
Start: 2018-12-02 | End: 2021-08-13

## 2018-12-02 RX ADMIN — ATORVASTATIN CALCIUM 40 MG: 20 TABLET, FILM COATED ORAL at 08:12

## 2018-12-02 RX ADMIN — ONDANSETRON 4 MG: 2 INJECTION INTRAMUSCULAR; INTRAVENOUS at 05:12

## 2018-12-02 RX ADMIN — LISINOPRIL 5 MG: 5 TABLET ORAL at 07:12

## 2018-12-02 RX ADMIN — Medication 3 ML: at 01:12

## 2018-12-02 RX ADMIN — HYDRALAZINE HYDROCHLORIDE 10 MG: 20 INJECTION INTRAMUSCULAR; INTRAVENOUS at 06:12

## 2018-12-02 RX ADMIN — STANDARDIZED SENNA CONCENTRATE AND DOCUSATE SODIUM 1 TABLET: 8.6; 5 TABLET, FILM COATED ORAL at 08:12

## 2018-12-02 NOTE — PT/OT/SLP EVAL
"Physical Therapy Evaluation     Patient Name: To Santos  MRN: [id number]   Diagnosis: Stroke due to embolism of left middle cerebral artery    Recommendations:   Discharge Recommendations:  outpatient PT   Discharge Equipment Recommendations: none   Barriers to Discharge: none    Plan:   During this hospitalization, patient will be seen 3 x/week for gait training, therapeutic activities, therapeutic exercises, neuromuscular re-education to address impairments and functional mobility deficits.   · Plan of Care Expires: 01/01/19   Plan of Care Reviewed with: patient, spouse    This plan of care has been discussed with the patient and/or family who were involved in its development and are in agreement with the identified goals and treatment plan.     History:     Past Medical History:   Diagnosis Date    Basal cell carcinoma     Diabetes mellitus        Past Surgical History:   Procedure Laterality Date    amputation right great toe      APPLICATION, GRAFT, SKIN, FULL-THICKNESS N/A 12/18/2013    Performed by Rafa [facility] at [facility] OR 2ND FLR    AURICULECTOMY Left 11/27/2013    Performed by Rafa Nguyen [facility] at [facility] OR 2ND FLR    RECONSTRUCTION USING FLAP Left 11/27/2013    Performed by [facility] at [facility] OR 2ND FLR    REVISION, PROCEDURE INVOLVING FLAP GRAFT Left 12/18/2013    Performed by Rafa Nguyen MD at [facility] OR 2ND FLR       Subjective   Patient comments/goals: "When can I leave? My balance..is that why I am here?  Pain/Comfort:  ·  Pain Rating 1: 0/10  · Pain Rating Post-Intervention 1: 0/10     Living Environment:  Home: The patient lives in his wife in [place] in a  Story home with 4-5 steps and NO RAIL to enter.  Tub/shower combo with no seat.   PLOF:  He was independent with mobility without an assistive device but had balance impairment and stooped gait posture.  He was scheduled to begin OP PT this week to address his gait changes.  He is undergoing work up for " Problem: Patient Care Overview  Goal: Plan of Care/Patient Progress Review  Patient admitted d/t possible status epilepticus. Hx of polysubstance abuse. VSS. A&Ox4. Neuros intact other than hoarse, soft voice and generalized weakness. VEEG discontinued earlier today. Regular diet, fair intake. PIV x2 SL. Up independently. Voiding spontaneously. No BM this shift. Denies pain. Will continue to monitor.        early stage Parkinson's.  His wife denies any recent falls.  The patient still drives. Retired  but still works some jobs.  DME owned: none.    Assistance Available: Upon discharge, patient will have assistance from his wife.    Objective:   The patient had blood pressure cuff, pulse ox (continuous), telemetry, peripheral IV    General Precautions: aspiration, fall(TPA precautions)  Recent Surgery: * No surgery found *    Recent Vital Signs:     Physical Examination:   The patient was found supine in ICU within 24 hr TPA window.  He agreed to therapy. He cooperated well with evidence of some confusion and memory loss..     Cognitive Function:  - Oriented to: person, place, and time  - Level of Alertness: awake and attentive  - Follows Commands/attention: Follows two-step commands  - Communication: clear/fluent  - Safety awareness/insight to disability: impaired  Musculoskeletal System  Upper Extremities:   PROM: WFL  Strength: WFL  Lower Extremities:  PROM: WFL  Strength:  Muscle Group R LE L LE Comments   Hip ext 5/5 5/5    Hip flexion  5/5 5/5       Knee flexion  5/5 5/5       Knee ext. 5/5 5/5    Ankle DF 5/5 5/5    Ankle PF 5/5 5/5    Cardiopulmonary System:   · HR: 112-122 bpm during activity  Neuromuscular System:  · Sensation: intact LT BUEs and BLEs  · Coordination:    Finger to thumb opposition: grossly intact   Finger to nose: grossly intact, LUE tremor    Heel to shin: NT  Posture and gross symmetry: rounded shoulders  Vision:  Visual tracks in all quadrants    BALANCE:  Sitting: independent  Standing: SBA with increased anteroposterior postural sway but no LOB    30 Second Sit to Stand Test  Instruct the patient to stand up from a chair without using their arms as many times as they can in 30 seconds.    Age Men Women   60-64 <14 <12   65-69 < 12 < 11   70-74 < 12 <10   75-79  < 11 <10   80-84 <10 < 9   85-89 < 8 < 8   90-94 < 7 < 4   *A below average score indicates a high risk for falls  The  patient completed 8 sit-stands during 30 seconds, compared to his sex and age matched normal of 12 repetitions.     FUNCTIONAL MOBILITY ASSESSMENT:  Bed Mobility: performed with HOB flat  · Rolling/Turning R: modified independent using bed rail  · Rolling/Turning L: NT  · Supine > sit: modified independent using bed rail  · Sit > supine: independent  · Scooting EOB: SBA    Transfers:  · Sit <> stand transfer: SBA   · Bed <> chair transfer: SBA    Gait:   Gait x 20 feet x 2 trials in room with close supervision for safety (TPA window) with some gait deviations  · Patient demonstrated hesitation/delayed initiation of gait but good reciprocal gait pattern, no difficulty turning, no LOB, and no significant safety concerns  · His wife states he normally walks with a stooped posture which was not present on eval    Stairs: NT d/t TPA precautions    Therapeutic Activities, Education, or Exercises:  The therapist educated the patient and his wife on the role of PT, POC, and therapy recommendations of OP PT to address balance deficits.  The therapist discussed the patients current mobility status, deficits, and level of assistance with patient and RN. Time was provided for active listening, discussion of health disposition, and discussion of safe discharge recommendations. Therapist answered questions to patient/familys satisfaction within scope of practice.  Patient and family are aware of patient's deficits and therapy progression. White board updated to reflect current level of assistance.    FUNCTIONAL OUTCOME MEASURES:  [facility]  Turning over in bed (including adjusting bedclothes, sheets and blankets)?: 4  Sitting down on and standing up from a chair with arms (e.g., wheelchair, bedside commode, etc.): 4  Moving from lying on back to sitting on the side of the bed?: 4  Moving to and from a bed to a chair (including a wheelchair)?: 3  Need to walk in hospital room?: 3  Climbing 3-5 steps with a railing?: 3  Basic  Mobility Total Score: 21    Goals:     Multidisciplinary Problems     Physical Therapy Goals        Problem: Physical Therapy Goal    Goal Priority Disciplines Outcome Goal Variances Interventions   Physical Therapy Goal     PT, PT/OT Ongoing (interventions implemented as appropriate)     Description:    Goals to be met by 12/12/2018    1. Pt will perform sit to stand transfers with independence.    2. Pt will perform bed <> chair transfers with independence.  3. Pt will perform gait x 150 feet with independence and no assistive device.  4.Patient will ascend and descend 5 steps with SBA and no rails to safely access home environment.                   Assessment:   To Santos is a 72 y.o. male admitted with a medical diagnosis of Stroke due to embolism of left middle cerebral artery.  Prior to admit he was independent with mobility but had balance impairment for which he was scheduled to begin OP therapy.  he now presents with the following impairments/functional limitations: gait instability, impaired balance, impaired cognition.   He appears close to his baseline with functional mobility especially gait and balance. He scored less on a balance assessment than his sex and age matched norms, indicating he is at risk for falls at this time. He would still benefit from outpatient therapy to address his balance and gait impairment, but is safe to return home when medically appropriate.  He is at high risk for deconditioning with a prolonged hospital stay.  Recommend skilled PT services during this hospital admission to prevent unnecessary deconditioning or change in mobility during this hospital stay.     Problem List:  gait instability, impaired balance, impaired cognition  Rehab Prognosis:  good.  The patient would benefit from acute skilled PT services to address these deficits and maximize their functional independence.    Clinical Decision Making:   The patient presents with 3 or more elements from any of  the following: body structures and functions, activity limitations, and/or participation restrictions per standardized tests and measures.  The patient's current clinical presentation is considered evolving with changing characteristics d/t medical acuity and/or medical stability in the acute care setting.   Evaluation Complexity:  Moderate - 46189     Time Tracking:   PT Received On:  12/02/18  PT Start Time:   0848    PT Stop Time:  0914  PT Total Time (min): 26 min      Billable Minutes: Evaluation 26    Polina Bourne, PT  10/02/2018  [phone] (pager)

## 2018-12-02 NOTE — PLAN OF CARE
Problem: Patient Care Overview  Goal: Plan of Care Review  Outcome: Ongoing (interventions implemented as appropriate)  POC reviewed with pt at 0500. Pt verbalized understanding. Questions and concerns addressed. No acute events overnight. Pt progressing toward goals. Will continue to monitor. See flowsheets for full assessment and VS info.

## 2018-12-02 NOTE — ASSESSMENT & PLAN NOTE
72 y.o. yo male with PMHx prior stroke (evidence on scan) and DM who presented to [facility] with R facial droop and aphasia that was initially waxing and waning. LKN 1530. Telemedicine with Dr. Lobo; tPA given. Pt was transferred to [facility] for stat CTA MP. Yasmeen Rondon, Cecily reviewed images as acquired. No LVO. Patient not a candidate for IR intervention. Admitted to [facility] for close monitoring/higher level of care.     Per family, pt not compliant with home ASA or diabetic diet; was recently started on insulin (NOT updated in med rec on file.) Pt also recently started on Bactrim for reported diabetic sores on the legs.     Family reports pt has had a cognitive/memory decline over the last year as well; was being evaluated for early Parkinson's syndrome but had not begun any therapies thus far. Has baseline L hand tremor.       Antithrombotics for secondary stroke prevention: Antiplatelets: None: Hold all Antithrombotics x 24 hours after IV t-PA administration  Statins for secondary stroke prevention and hyperlipidemia, if present:   Statins: Atorvastatin- 40 mg daily  Aggressive risk factor modification: HTN, DM, HLD, Diet, Exercise, Obesity  Rehab efforts: PT/OT/SLP to evaluate and treat, PM&R consult   Diagnostics ordered/pending: HgbA1C to assess blood glucose levels, Lipid Profile to assess cholesterol levels, MRI head without contrast to assess brain parenchyma, TTE to assess cardiac function/status , TSH to assess thyroid function  VTE prophylaxis: None: Reason for No Pharmacological VTE Prophylaxis: Mechanical prophylaxis: Place SCDs  BP parameters: Infarct: Post tPA, SBP <180

## 2018-12-02 NOTE — SUBJECTIVE & OBJECTIVE
Neurologic Chief Complaint: Right facial droop, aphasia.     Subjective:     Interval History: Patient is seen for follow-up neurological assessment and treatment recommendations: Acute onset right facial droop and aphasia that resolved under 24hrs. Was given tPA at OSH and transferred to [facility] for higher level care. CTA showed no LVO      HPI, Past Medical, Family, and Social History remains the same as documented in the initial encounter.     Review of Systems   Constitutional: Negative for fever.   HENT: Negative for trouble swallowing.    Eyes: Negative for visual disturbance.   Respiratory: Negative for shortness of breath.    Cardiovascular: Negative for chest pain.   Gastrointestinal: Negative for abdominal pain.   Neurological: Negative for facial asymmetry, weakness and numbness.   Psychiatric/Behavioral: Positive for confusion. Negative for agitation.     Scheduled Meds:   atorvastatin  40 mg Oral Daily    lisinopril  5 mg Oral Daily    midazolam        ondansetron        senna-docusate 8.6-50 mg  1 tablet Oral Daily    sodium chloride 0.9%  3 mL Intravenous Q8H     Continuous Infusions:  PRN Meds:acetaminophen, dextrose 50%, dextrose 50%, glucagon (human recombinant), glucose, glucose, hydrALAZINE, insulin aspart U-100, midazolam, ondansetron    Objective:     Vital Signs (Most Recent):  Temp: 98.5 °F (36.9 °C) (12/02/18 1600)  Pulse: 109 (12/02/18 1600)  Resp: (!) 25 (12/02/18 1600)  BP: (!) 169/89 (12/02/18 1600)  SpO2: 97 % (12/02/18 1600)  BP Location: Left arm    Vital Signs Range (Last 24H):  Temp:  [97.8 °F (36.6 °C)-98.5 °F (36.9 °C)]   Pulse:  [101-118]   Resp:  [12-27]   BP: (119-206)/()   SpO2:  [94 %-100 %]   BP Location: Left arm    Physical Exam   Constitutional: No distress.   Eyes: Pupils are equal, round, and reactive to light.   Cardiovascular: Normal rate.   Pulmonary/Chest: Effort normal.   Neurological: He is alert.   Nursing note and vitals reviewed.      Neurological Exam:    LOC: alert  Language: No aphasia  Orientation: Not oriented to place, Not oriented to time  EOM (CN III, IV, VI): Full/intact  Facial Movement (CN VII): Symmetric facial expression    Motor: Arm left  Normal 5/5  Leg left  Normal 5/5  Arm right  Normal 5/5  Leg right Normal 5/5  Cebellar: No evidence of appendicular or axial ataxia  Sensation: Intact to light touch, temperature and vibration    Laboratory:  CMP:   Recent Labs   Lab 12/02/18  0309   CALCIUM 8.5*   ALBUMIN 3.2*   PROT 5.6*      K 3.8   CO2 25      BUN 15   CREATININE 1.0   ALKPHOS 45*   ALT 13   AST 9*   BILITOT 0.4     CBC:   Recent Labs   Lab 12/02/18  0309   WBC 7.77   RBC 4.46*   HGB 13.8*   HCT 39.5*      MCV 89   MCH 30.9   MCHC 34.9     Lipid Panel:   Recent Labs   Lab 12/01/18  2155   CHOL 172   LDLCALC 104.8   HDL 39*   TRIG 141     Coagulation:   Recent Labs   Lab 12/02/18  0309   INR 1.1     Platelet Aggregation Study: No results for input(s): PLTAGG, PLTAGINTERP, PLTAGREGLACO, ADPPLTAGGREG in the last 168 hours.  Hgb A1C:   Recent Labs   Lab 12/01/18 2155   HGBA1C 9.0*     TSH:   Recent Labs   Lab 12/01/18 2155   TSH 1.436       Diagnostic Results     Brain/Vessel Imaging:    CTA Multiphase 12/1/18  No acute intracranial hemorrhage or major vascular distribution infarct.  Redemonstration of a lacunar type infarct within the left periventricular white matter, unchanged dating back to 08/29/2018.  MRI may be attempted for further evaluation.  Redemonstration of generalized cerebral volume loss and chronic microvascular ischemic changes.  No focal high-grade stenosis, occlusion, or aneurysm involving the intracranial arteries.  Significant tortuosity involving the origin of the left vertebral artery with mild associated narrowing.  Bilateral vertebral arteries demonstrate focal moderate narrowing within the cervical segments with remainder of vertebral circulation within normal limits.    MRI Brain 12/2/18  1. No acute  infarct or hemorrhage.  2. Sequela of chronic microvascular ischemic change, senescent change, and remote infarcts.  3. Sinus disease.    Cardiac Imaging     ECHO 12/2/18  · Tachycardia was present during the study  · Concentric left ventricular remodeling.Normal left ventricular systolic function. The estimated ejection fraction is 55%  · Mild left atrial enlargement.  · Indeterminate left ventricular diastolic function. E and A are fused secondary to tachycardia  · Normal right ventricular systolic function.  · Mild mitral sclerosis without stenosis  · Aortic valve sclerosis without stenosis  · Normal central venous pressure (3 mm Hg).

## 2018-12-02 NOTE — PLAN OF CARE
Problem: SLP Goal  Goal: SLP Goal  Speech Language Pathology Goals  Goals expected to be met by 12/9/18    1.  Pt will tolerate regular consistency diet w/ thin liquids w/ no overt signs of aspiration.  2.  Pt will complete Speech Language Cognitive evaluation to determine the need for additional goals.      Clinical Swallow Evaluation completed.  REC:  Pt resume po intake w/ regular diet w/ thin liquids, oral meds whole 1-2 at a time, aspiration precautions.  Recs reviewed w/ RN.  Cont ST per POC.    Vernell Stubbs CCC-SLP  12/2/2018

## 2018-12-02 NOTE — PROGRESS NOTES
Patient arrived to [facility] from [facility] by ambulance    Type of stroke/diagnosis: L MCA    TPA start and end time (if applicable): 1746/1745    Thrombectomy start and end time (if applicable): NA    Current symptoms: mild weakness    Skin assessment done: Y  Wounds noted: none noted    NCC notified: Josy LAIRD

## 2018-12-02 NOTE — ED PROVIDER NOTES
Encounter Date: 12/1/2018    SCRIBE #1 NOTE: I, Pedro Luis Falcon, am scribing for, and in the presence of,  Dr. Ortiz. I have scribed the following portions of the note - Other sections scribed: HPI, ROS, PE.       History     Chief Complaint   Patient presents with    Stroke Transfer     Time seen by provider: 6:50 PM    This is a 72 y.o. male with history of basal cell carcinoma and diabetes mellitus, transferred from [facility] for evaluation of stroke. Patient presented to the transferring facility with sudden onset slurred speech, left facial droop, confusion at approximately 3:40 PM today. Patient was evaluated there with Telestroke consult and administered tPA. Per daughter, patient transitioned between normal and slurred speech several times, but currently has normal speech on arrival here. Patient is otherwise currently asymptomatic. Patient reports history of previous TIA with right-sided deficits. Patient does not take any blood thinners or aspirin. He denies smoking history.       The history is provided by the patient and medical records.     Review of patient's allergies indicates:   Allergen Reactions    Tetanus vaccines and toxoid      Past Medical History:   Diagnosis Date    Basal cell carcinoma     Diabetes mellitus      Past Surgical History:   Procedure Laterality Date    amputation right great toe      APPLICATION, GRAFT, SKIN, FULL-THICKNESS N/A 12/18/2013    Performed by [facility] at [facility]    AURICULECTOMY Left 11/27/2013    Performed by [facility] at [facility]    RECONSTRUCTION USING FLAP Left 11/27/2013    Performed by [facility] at [facility]    REVISION, PROCEDURE INVOLVING FLAP GRAFT Left 12/18/2013    Performed by [facility] at [facility] OR [facility]     History reviewed. No pertinent family history.  Social History     Tobacco Use    Smoking status: Never Smoker    Smokeless tobacco: Never Used    Substance Use Topics    Alcohol use: Yes     Alcohol/week: 0.5 oz     Types: 1 Standard drinks or equivalent per week    Drug use: No     Review of Systems   Constitutional: Negative for fever.   HENT: Negative for sore throat.    Respiratory: Negative for shortness of breath.    Cardiovascular: Negative for chest pain.   Gastrointestinal: Negative for nausea.   Genitourinary: Negative for dysuria.   Musculoskeletal: Negative for back pain.   Skin: Negative for rash.   Neurological: Negative for facial asymmetry, speech difficulty, weakness, numbness and headaches.   Hematological: Does not bruise/bleed easily.       Physical Exam     Initial Vitals   BP Pulse Resp Temp SpO2   12/01/18 1855 12/01/18 1855 12/01/18 1855 12/01/18 1924 12/01/18 1855   (!) 164/72 108 (!) 24 98.3 °F (36.8 °C) 100 %      MAP       --                Physical Exam    Nursing note and vitals reviewed.  Constitutional: He appears well-developed and well-nourished. He is not diaphoretic. No distress.   HENT:   Head: Normocephalic and atraumatic.   Mouth/Throat: Oropharynx is clear and moist.   Neck: Normal range of motion. Neck supple. No JVD present.   Cardiovascular: Normal rate, regular rhythm, normal heart sounds and intact distal pulses.   Pulmonary/Chest: Breath sounds normal. No respiratory distress. He has no wheezes. He has no rhonchi. He has no rales.   Abdominal: Soft. He exhibits no distension. There is no tenderness.   Musculoskeletal: Normal range of motion. He exhibits no edema or tenderness.   Neurological: He is alert and oriented to person, place, and time. He has normal strength. No cranial nerve deficit or sensory deficit. GCS eye subscore is 4. GCS verbal subscore is 5. GCS motor subscore is 6.   NIH Stroke Scale 0   Skin: Skin is warm and dry.         ED Course   Critical Care  Date/Time: 12/1/2018 8:16 PM  Performed by: Moses Blandon MD  Authorized by: Moses Blandon MD   Direct patient critical  care time: 30 minutes  Additional history critical care time: 10 minutes  Ordering / reviewing critical care time: 10 minutes  Documentation critical care time: 10 minutes  Consulting other physicians critical care time: 0 minutes  Consult with family critical care time: 0 minutes  Total critical care time (exclusive of procedural time) : 60 minutes  Critical care was necessary to treat or prevent imminent or life-threatening deterioration of the following conditions: CNS failure or compromise.        Labs Reviewed   POCT GLUCOSE     EKG Readings: (Independently Interpreted)   Normal sinus rhythm at the rate of 112 normal axis mild left atrial enlargement cues in V1 and V2 no reciprocal changes mild nonspecific ST changes in the inferior leads no evidence of a STEMI       Imaging Results          CTA STROKE MULTI-PHASE (Final result)  Result time 12/01/18 20:10:43    Final result by Misael White MD (12/01/18 20:10:43)                 Impression:      No acute intracranial hemorrhage or major vascular distribution infarct.  Redemonstration of a lacunar type infarct within the left periventricular white matter, unchanged dating back to 08/29/2018.  MRI may be attempted for further evaluation.    Redemonstration of generalized cerebral volume loss and chronic microvascular ischemic changes.    No focal high-grade stenosis, occlusion, or aneurysm involving the intracranial arteries.    Significant tortuosity involving the origin of the left vertebral artery with mild associated narrowing.  Bilateral vertebral arteries demonstrate focal moderate narrowing within the cervical segments with remainder of vertebral circulation within normal limits.    Electronically signed by resident: Joaquim Parekh  Date:    12/01/2018  Time:    19:24    Electronically signed by: Misael White MD  Date:    12/01/2018  Time:    20:10             Narrative:    EXAMINATION:  CTA STROKE MULTI-PHASE    CLINICAL HISTORY:  Left-sided weakness.  Status  post tPA administration.    TECHNIQUE:  Non contrast low dose axial images were obtained thought the head. CT angiogram was performed from the level of the temo to the top of the head following the IV administration of 100mL of Omnipaque 350.   Sagittal and coronal reconstructions and maximum intensity projection reconstructions were performed. Arterial stenosis percentages are based on NASCET measurement criteria.  Two additional phases of immediate post-contrast CTA images were performed through the head alone.    COMPARISON:  CT head 12/01/2018, 08/29/2018, and CTA chest 01/25/2012    FINDINGS:  Intracranial Compartment:    Redemonstration of generalized cerebral volume loss with compensatory dilatation of the ventricular system and sulci.  Redemonstration of a small lacunar type infarct within the left-sided periventricular white matter.  Superimposed patchy periventricular hypoattenuation, similar to prior exams and likely representing moderate chronic microvascular ischemic change.  No acute intraparenchymal hemorrhage or major vascular distribution infarct. No extra-axial fluid collection or hemorrhage.    No hydrocephalus.    Skull/Extracranial Contents (limited evaluation): No fracture. Mastoid air cells and paranasal sinuses are essentially clear.  Skull base within normal limits.    Non-Vascular Structures of the Neck/Thoracic Inlet (limited evaluation): Redemonstration of prominent calcified right paratracheal lymph nodes as well as a calcified granuloma within the right upper lobe.  Mild cervical spondylosis most prominent at C5-C6.    Aorta: 2 branch vessel arch with common origin of the left common carotid artery and right brachiocephalic artery.    Extracranial carotid circulation: Trace calcific atherosclerosis without hemodynamically significant stenosis, aneurysmal dilatation, or dissection.    Extracranial vertebral circulation: Significant tortuosity of the origin of the left vertebral artery  with mild associated narrowing.  Bilateral vertebral arteries demonstrate moderate narrowing within their cervical portions (axial series 5, image 431).  Remainder of vertebral artery system intact without significant abnormality.    Intracranial Arteries: No focal high-grade stenosis, occlusion, or aneurysm.    Venous structures (limited evaluation): Normal.                                 Medical Decision Making:   History:   Old Medical Records: I decided to obtain old medical records.  Clinical Tests:   Radiological Study: Ordered and Reviewed    Additional MDM:     NIH Stroke Scale:   Interval = baseline (upon arrival/admit)  Level of consciousness = 0 - alert  LOC questions = 0 - answers both correctly  Best gaze = 0 - normal  Facial palsy = 0 - normal  Motor left arm =  0 - no drift  Motor right arm =  0 - no drift  Motor left leg = 0 - no drift  Motor right leg =  0 - no drift  Limb ataxia = 0 - absent  Sensory = 0 - normal  Best language = 0 - no aphasia  Dysarthria = 0 - normal articulation  NIH Stroke Scale Total = 0         Scribe Attestation:   Scribe #1: I performed the above scribed service and the documentation accurately describes the services I performed. I attest to the accuracy of the note.    Attending Attestation:             Attending ED Notes:   I, Dr. Moses Elmore, personally performed the services described in this documentation. All medical record entries made by the scribe were at my direction and in my presence.  I have reviewed the chart and agree that the record reflects my personal performance and is accurate and complete. Moses Elmore MD.  8:14 PM 12/01/2018             Clinical Impression:   The encounter diagnosis was Cerebrovascular accident (CVA), unspecified mechanism.      Disposition:   Disposition: Admitted  Condition: Stable                        Moses Blandon MD  12/01/18 2017       Moses Blandon MD  12/01/18 2023

## 2018-12-02 NOTE — PT/OT/SLP EVAL
"Speech Language Pathology Evaluation  Bedside Swallow    Patient Name:  To Santos   MRN:  [id number]  Admitting Diagnosis: Stroke due to embolism of left middle cerebral artery    Recommendations:                 General Recommendations:  Dysphagia therapy and Cognitive-linguistic evaluation  Diet recommendations:  Regular, Thin   Aspiration Precautions: Meds whole 1 at a time, Monitor for s/s of aspiration and Standard aspiration precautions   General Precautions: Standard, aspiration, fall  Communication strategies:  none    History:     Past Medical History:   Diagnosis Date    Basal cell carcinoma     Diabetes mellitus        Past Surgical History:   Procedure Laterality Date    amputation right great toe      APPLICATION, GRAFT, SKIN, FULL-THICKNESS N/A 12/18/2013    Performed by [facility]    AURICULECTOMY Left 11/27/2013    Performed by [facility] at [facility] OR [facility]    RECONSTRUCTION USING FLAP Left 11/27/2013    Performed by [facility] at [facility]    REVISION, PROCEDURE INVOLVING FLAP GRAFT Left 12/18/2013    Performed by [facility] at [facility]       Social History: Patient lives with his wife.    Prior Intubation HX:  None this admission    Modified Barium Swallow: none this admission    Chest X-Rays: 12/1/18:  Left basilar infiltrate with possible left pleural fluid collection.    Prior diet: regular w/ thin liquids    Occupation/hobbies/homemaking: none expressed.    Subjective     "I'm hungry. What you have?"  Pt asked SLP  Patient goals: to eat and drink     Pain/Comfort:  · Pain Rating 1: 0/10  · Pain Rating Post-Intervention 1: 0/10    Objective:     Oral Musculature Evaluation  · Oral Musculature: WFL  · Dentition: scattered dentition  · Secretion Management: adequate  · Mucosal Quality: adequate  · Mandibular Strength and Mobility: WFL  · Oral Labial Strength and Mobility: WFL  · Lingual Strength and " Mobility: WFL  · Volitional Cough: adequate  · Volitional Swallow: present   · Voice Prior to PO Intake: clear    Bedside Swallow Eval:   Consistencies Assessed:  · Thin liquids tsp, cup and straw sips  · Puree tsp bites  · Solids self fed bites     Oral Phase:   · WFL    Pharyngeal Phase:   · WFL  · no overt clinical signs/symptoms of aspiration  · no overt clinical signs/symptoms of pharyngeal dysphagia    Compensatory Strategies  · None    Treatment: Education was provided to pt and spouse re: SLP role, eval results, diet recs, aspiration precautions and SLP poc.  They indicated good understanding and agreed w/ recommendations.  Pt;s whiteboard was updated.    Assessment:     To Santos is a 72 y.o. male with an SLP diagnosis of Dysphagia.  He presents with a functional oropharyngeal swallow.  He would benefit from further evaluation of cognitive-communication skills and monitoring of diet tolerance.    Goals:   Multidisciplinary Problems     SLP Goals        Problem: SLP Goal    Goal Priority Disciplines Outcome   SLP Goal     SLP    Description:  Speech Language Pathology Goals  Goals expected to be met by 12/9/18    1.  Pt will tolerate regular consistency diet w/ thin liquids w/ no overt signs of aspiration.  2.  Pt will complete Speech Language Cognitive evaluation to determine the need for additional goals.                        Plan:     · Patient to be seen:  3 x/week   · Plan of Care expires:  12/31/18  · Plan of Care reviewed with:  patient, spouse   · SLP Follow-Up:  Yes       Discharge recommendations:  other (see comments)(pending pt/ot recs)   Barriers to Discharge:  None    Time Tracking:     SLP Treatment Date:   12/02/18  Speech Start Time:  0822  Speech Stop Time:  0834     Speech Total Time (min):  12 min    Billable Minutes: Eval Swallow and Oral Function 12    Vernell Stubbs CCC-SLP  12/02/2018

## 2018-12-02 NOTE — SUBJECTIVE & OBJECTIVE
Past Medical History:   Diagnosis Date    Basal cell carcinoma     Diabetes mellitus      Past Surgical History:   Procedure Laterality Date    amputation right great toe      APPLICATION, GRAFT, SKIN, FULL-THICKNESS N/A 12/18/2013    [facility]formed [facility]    AURICULECTOMY Left 11/27/2013    Performed by [facility] at [facility]    RECONSTRUCTION USING FLAP Left 11/27/2013    Performed by [facility]    REVISION, PROCEDURE INVOLVING FLAP GRAFT Left 12/18/2013    Performed by [facility] at [facility]     History reviewed. No pertinent family history.  Social History     Tobacco Use    Smoking status: Never Smoker    Smokeless tobacco: Never Used   Substance Use Topics    Alcohol use: Yes     Alcohol/week: 0.5 oz     Types: 1 Standard drinks or equivalent per week    Drug use: No     Review of patient's allergies indicates:   Allergen Reactions    Tetanus vaccines and toxoid        Medications: I have reviewed the current medication administration record.      (Not in a hospital admission)    Review of Systems   Constitutional: Negative for fatigue and fever.   HENT: Negative for facial swelling and nosebleeds.    Eyes: Negative for discharge and visual disturbance.   Respiratory: Negative for choking and stridor.    Cardiovascular: Negative for palpitations and leg swelling.   Gastrointestinal: Negative for nausea and vomiting.   Musculoskeletal: Negative for joint swelling and neck stiffness.   Skin: Negative for pallor and rash.   Neurological: Positive for facial asymmetry, speech difficulty and weakness.   Psychiatric/Behavioral: Positive for confusion. Negative for agitation.     Objective:     Vital Signs (Most Recent):  Temp: 98.3 °F (36.8 °C) (12/01/18 1924)  Pulse: 102 (12/01/18 2015)  Resp: 20 (12/01/18 2015)  BP: (!) 173/100 (12/01/18 2015)  SpO2: 97 % (12/01/18 2015)    Vital Signs Range  (Last 24H):  Temp:  [98.3 °F (36.8 °C)]   Pulse:  []   Resp:  [14-26]   BP: (153-196)/()   SpO2:  [96 %-100 %]     Physical Exam   Constitutional: He is oriented to person, place, and time. He is cooperative. No distress.   HENT:   Head: Normocephalic and atraumatic.   Eyes: Conjunctivae and EOM are normal.   Cardiovascular: Normal rate.   Pulmonary/Chest: Effort normal. No respiratory distress.   Musculoskeletal: He exhibits no edema, tenderness or deformity.   Neurological: He is alert and oriented to person, place, and time. A cranial nerve deficit is present. No sensory deficit. He exhibits normal muscle tone. Coordination normal.   Skin: Skin is warm and dry.   Psychiatric: He has a normal mood and affect. His behavior is normal.       Neurological Exam:   LOC: alert  Attention Span: Good   Language: No aphasia  Articulation: Dysarthria  Orientation: Person, Place, Time   Visual Fields: Full  EOM (CN III, IV, VI): Full/intact  Facial Movement (CN VII): Lower facial weakness on the Right  Motor: 5/5 throughout  Cebellar: No evidence of appendicular or axial ataxia  Sensation: Intact to light touch, temp; No tactile neglect  Tone: Normal tone throughout      Laboratory:  CMP:   Recent Labs   Lab 12/01/18  1605   CALCIUM 9.3   ALBUMIN 4.0   PROT 6.8      K 4.4   CO2 26      BUN 18   CREATININE 1.10   ALKPHOS 50   ALT 21   AST 24   BILITOT 0.7     CBC:   Recent Labs   Lab 12/01/18  1605   WBC 6.46   RBC 5.00   HGB 15.0   HCT 43.4      MCV 87   MCH 30.0   MCHC 34.6     Lipid Panel: No results for input(s): CHOL, LDLCALC, HDL, TRIG in the last 168 hours.  Coagulation:   Recent Labs   Lab 12/01/18  1605   INR 1.1     Hgb A1C: No results for input(s): HGBA1C in the last 168 hours.  TSH:   Recent Labs   Lab 12/01/18  1605   TSH 2.410       Diagnostic Results:      Brain/Vessel imaging:    CTA Multiphase 12/1/18  No acute intracranial hemorrhage or major vascular distribution infarct.   Redemonstration of a lacunar type infarct within the left periventricular white matter, unchanged dating back to 08/29/2018.  MRI may be attempted for further evaluation.  Redemonstration of generalized cerebral volume loss and chronic microvascular ischemic changes.  No focal high-grade stenosis, occlusion, or aneurysm involving the intracranial arteries.  Significant tortuosity involving the origin of the left vertebral artery with mild associated narrowing.  Bilateral vertebral arteries demonstrate focal moderate narrowing within the cervical segments with remainder of vertebral circulation within normal limits.

## 2018-12-02 NOTE — ASSESSMENT & PLAN NOTE
Stroke risk factor. Patient is not compliant with his diabetic regimen. Insulin was started around 3-4 months ago and was recently increased as A1c was apparently >10. Continue Amaryl 2mg/1mg/1mg  -- Currently on Amaryl 1mg daily and long acting insulin 30U qdaily  -- A1c on admission 9  -- Advised tight glucose control and regular accuchecks  -- Follow up with PCP.

## 2018-12-02 NOTE — CONSULTS
[facility]  Vascular Neurology  Comprehensive Stroke Center  Consult Note    Inpatient consult to Vascular (Stroke) Neurology  Consult performed by: Apryl Pace PA-C  Consult ordered by: Moses Blandon MD        Assessment/Plan:     Patient is a 72 y.o. year old male with:    * Stroke due to embolism of left middle cerebral artery    72 y.o. yo male with PMHx prior stroke (evidence on scan) and DM who presented to [facility] with R facial droop and aphasia that was initially waxing and waning. LKN 1530. Telemedicine with Dr. Lobo; tPA given. Pt was transferred to [facility] for stat CTA MP. Yasmeen Rondon, Cecily reviewed images as acquired. No LVO. Patient not a candidate for IR intervention. Admitted to [facility] for close monitoring/higher level of care.     Per family, pt not compliant with home ASA or diabetic diet; was recently started on insulin (NOT updated in med rec on file.) Pt also recently started on Bactrim for reported diabetic sores on the legs.     Family reports pt has had a cognitive/memory decline over the last year as well; was being evaluated for early Parkinson's syndrome but had not begun any therapies thus far. Has baseline L hand tremor.       Antithrombotics for secondary stroke prevention: Antiplatelets: None: Hold all Antithrombotics x 24 hours after IV t-PA administration  Statins for secondary stroke prevention and hyperlipidemia, if present:   Statins: Atorvastatin- 40 mg daily  Aggressive risk factor modification: HTN, DM, HLD, Diet, Exercise, Obesity  Rehab efforts: PT/OT/SLP to evaluate and treat, PM&R consult   Diagnostics ordered/pending: HgbA1C to assess blood glucose levels, Lipid Profile to assess cholesterol levels, MRI head without contrast to assess brain parenchyma, TTE to assess cardiac function/status , TSH to assess thyroid function  VTE prophylaxis: None: Reason for No Pharmacological VTE Prophylaxis: Mechanical prophylaxis: Place SCDs  BP  parameters: Infarct: Post tPA, SBP <180     Tissue plasminogen activator (t-PA) administered at other facility within 24 hours prior to current admission    Admitted to [facility] for 24-hr close monitoring     Type 2 diabetes mellitus, with long-term current use of insulin    Stroke risk factor  A1c pending  Need to confirm pt's home insulin regimen and restart Bactrim (for diabetic LE sores) while admitted  Recommend tight glucose control  SSI while inpatient     Obesity, unspecified    Stroke risk factor   pt on importance of diet, exercise, lifestyle modifications for secondary stroke prevention         STROKE DOCUMENTATION     Acute Stroke Times   Last Known Normal Date: 12/01/18  Last Known Normal Time: 1530  Symptom Onset Date: 12/01/18  Symptom Onset Time: 1545  Stroke Team Called Date: 12/01/18  Stroke Team Called Time: 1855  Stroke Team Arrival Date: 12/01/18  Stroke Team Arrival Time: 1859  CT Interpretation Time: 2003    NIH Scale:  Interval: baseline (upon arrival/admit)  1a. Level Of Consciousness: 0-->Alert: keenly responsive  1b. LOC Questions: 0-->Answers both questions correctly  1c. LOC Commands: 0-->Performs both tasks correctly  2. Best Gaze: 0-->Normal  3. Visual: 0-->No visual loss  4. Facial Palsy: 1-->Minor paralysis (flattened nasolabial fold, asymmetry on smiling)  5a. Motor Arm, Left: 0-->No drift: limb holds 90 (or 45) degrees for full 10 secs  5b. Motor Arm, Right: 0-->No drift: limb holds 90 (or 45) degrees for full 10 secs  6a. Motor Leg, Left: 0-->No drift: leg holds 30 degree position for full 5 secs  6b. Motor Leg, Right: 0-->No drift: leg holds 30 degree position for full 5 secs  7. Limb Ataxia: 0-->Absent  8. Sensory: 0-->Normal: no sensory loss  9. Best Language: 0-->No aphasia: normal  10. Dysarthria: 1-->Mild-to-moderate dysarthria: patient slurs at least some words and, at worst, can be understood with some difficulty  11. Extinction and Inattention (formerly Neglect): 0-->No  abnormality  Total (NIH Stroke Scale): 2    Modified George Score: 2  [place] Coma Scale:    ABCD2 Score:    [id number] Score:   HAS -BLED Score:   ICH Score:   Hunt & Sung Classification:       Thrombolysis Candidate? Yes, given prior to arrival at outside hospital      Interventional Revascularization Candidate?   Is the patient eligible for mechanical endovascular reperfusion (STANLEY)?  No; No large vessel occlusion      Hemorrhagic change of an Ischemic Stroke: Does this patient have an ischemic stroke with hemorrhagic changes? No     Subjective:     History of Present Illness:  To Santos is a 72 y.o. male with PMHx DM who presented as a transfer from [facility] for evaluation of L MCA syndrome. Pt was LKN 1530 when he acutely developed R facial weakness and aphasia while talking with his daughter, though he was still able to ambulate somewhat. CTH at OSH demonstrated no acute abnormalities. Telestroke consult performed by Dr. Lobo and tPA administered. Transferred to [facility] for further evaluation/possible intervention.  On arrival, pt significantly improved; alert, following commands, with no appreciable aphasia. Taken for stat CTA MP.     Per family, denies pt hx blood clots or cardiac arrhythmias; he is supposed to take ASA at home but is reportedly non-compliant. He lives with others and performs most all ADLs independently.  Family reports pt is also non-compliant with diabetic diet, was recently started on insulin in last month (NOT updated in med rec on file.) Pt was also recently started on Bactrim for what was believed to be an infection of the LE skin, associated with DM. Family reports pt has had a cognitive/memory decline over the last year as well; was being evaluated for early Parkinson's syndrome but had not begun any therapies thus far. Has baseline L hand tremor.         Past Medical History:   Diagnosis Date    Basal cell carcinoma     Diabetes mellitus      Past  Surgical History:   Procedure Laterality Date    amputation right great toe      APPLICATION, GRAFT, SKIN, FULL-THICKNESS N/A 12/18/2013    Performed by [facility]a [facility] at [facility] OR 2ND FLR    AURICULECTOMY Left 11/27/2013    Performed by [facility] at [facility] OR [facility]    RECONSTRUCTION USING FLAP Left 11/27/2013    Performed by [facility] MD at [facility] OR [facility]    REVISION, PROCEDURE INVOLVING FLAP GRAFT Left 12/18/2013    Performed by Rafa Nguyen MD at [facility] OR [facility]     History reviewed. No pertinent family history.  Social History     Tobacco Use    Smoking status: Never Smoker    Smokeless tobacco: Never Used   Substance Use Topics    Alcohol use: Yes     Alcohol/week: 0.5 oz     Types: 1 Standard drinks or equivalent per week    Drug use: No     Review of patient's allergies indicates:   Allergen Reactions    Tetanus vaccines and toxoid        Medications: I have reviewed the current medication administration record.      (Not in a hospital admission)    Review of Systems   Constitutional: Negative for fatigue and fever.   HENT: Negative for facial swelling and nosebleeds.    Eyes: Negative for discharge and visual disturbance.   Respiratory: Negative for choking and stridor.    Cardiovascular: Negative for palpitations and leg swelling.   Gastrointestinal: Negative for nausea and vomiting.   Musculoskeletal: Negative for joint swelling and neck stiffness.   Skin: Negative for pallor and rash.   Neurological: Positive for facial asymmetry, speech difficulty and weakness.   Psychiatric/Behavioral: Positive for confusion. Negative for agitation.     Objective:     Vital Signs (Most Recent):  Temp: 98.3 °F (36.8 °C) (12/01/18 1924)  Pulse: 102 (12/01/18 2015)  Resp: 20 (12/01/18 2015)  BP: (!) 173/100 (12/01/18 2015)  SpO2: 97 % (12/01/18 2015)    Vital Signs Range (Last 24H):  Temp:  [98.3 °F (36.8 °C)]   Pulse:  []   Resp:  [14-26]   BP: (153-196)/()    SpO2:  [96 %-100 %]     Physical Exam   Constitutional: He is oriented to person, place, and time. He is cooperative. No distress.   HENT:   Head: Normocephalic and atraumatic.   Eyes: Conjunctivae and EOM are normal.   Cardiovascular: Normal rate.   Pulmonary/Chest: Effort normal. No respiratory distress.   Musculoskeletal: He exhibits no edema, tenderness or deformity.   Neurological: He is alert and oriented to person, place, and time. A cranial nerve deficit is present. No sensory deficit. He exhibits normal muscle tone. Coordination normal.   Skin: Skin is warm and dry.   Psychiatric: He has a normal mood and affect. His behavior is normal.       Neurological Exam:   LOC: alert  Attention Span: Good   Language: No aphasia  Articulation: Dysarthria  Orientation: Person, Place, Time   Visual Fields: Full  EOM (CN III, IV, VI): Full/intact  Facial Movement (CN VII): Lower facial weakness on the Right  Motor: 5/5 throughout  Cebellar: No evidence of appendicular or axial ataxia  Sensation: Intact to light touch, temp; No tactile neglect  Tone: Normal tone throughout      Laboratory:  CMP:   Recent Labs   Lab 12/01/18  1605   CALCIUM 9.3   ALBUMIN 4.0   PROT 6.8      K 4.4   CO2 26      BUN 18   CREATININE 1.10   ALKPHOS 50   ALT 21   AST 24   BILITOT 0.7     CBC:   Recent Labs   Lab 12/01/18  1605   WBC 6.46   RBC 5.00   HGB 15.0   HCT 43.4      MCV 87   MCH 30.0   MCHC 34.6     Lipid Panel: No results for input(s): CHOL, LDLCALC, HDL, TRIG in the last 168 hours.  Coagulation:   Recent Labs   Lab 12/01/18  1605   INR 1.1     Hgb A1C: No results for input(s): HGBA1C in the last 168 hours.  TSH:   Recent Labs   Lab 12/01/18  1605   TSH 2.410       Diagnostic Results:      Brain/Vessel imaging:    CTA Multiphase 12/1/18  No acute intracranial hemorrhage or major vascular distribution infarct.  Redemonstration of a lacunar type infarct within the left periventricular white matter, unchanged dating  back to 08/29/2018.  MRI may be attempted for further evaluation.  Redemonstration of generalized cerebral volume loss and chronic microvascular ischemic changes.  No focal high-grade stenosis, occlusion, or aneurysm involving the intracranial arteries.  Significant tortuosity involving the origin of the left vertebral artery with mild associated narrowing.  Bilateral vertebral arteries demonstrate focal moderate narrowing within the cervical segments with remainder of vertebral circulation within normal limits.      Apryl Pace PA-C  [facility]  Department of Vascular Neurology   [facility]

## 2018-12-02 NOTE — PROGRESS NOTES
Zofran given per order. Pt discharged by RN per order. Pt wife and daughter at bedside. All of pt belongings collected by family.

## 2018-12-02 NOTE — DISCHARGE SUMMARY
[facility]  Vascular Neurology  Comprehensive Stroke Center  Discharge Summary     Summary:     Admit Date: 12/1/2018  6:55 PM    Discharge Date and Time:  12/02/2018 5:27 PM    Attending Physician: Ginger Bustos MD     Discharge Provider: Krzysztof Staton MD    History of Present Illness: To Santos is a 72 y.o. male with PMHx DM who presented as a transfer from [facility] for evaluation of L MCA syndrome. Pt was LKN 1530 when he acutely developed R facial weakness and aphasia while talking with his daughter, though he was still able to ambulate somewhat. CTH at OSH demonstrated no acute abnormalities. Telestroke consult performed by Dr. Lobo and tPA administered. Transferred to [facility] for further evaluation/possible intervention.  On arrival, pt significantly improved; alert, following commands, with no appreciable aphasia. Taken for stat CTA MP.     Per family, denies pt hx blood clots or cardiac arrhythmias; he is supposed to take ASA at home but is reportedly non-compliant. He lives with others and performs most all ADLs independently.  Family reports pt is also non-compliant with diabetic diet, was recently started on insulin in last month (NOT updated in med rec on file.) Pt was also recently started on Bactrim for what was believed to be an infection of the LE skin, associated with DM. Family reports pt has had a cognitive/memory decline over the last year as well; was being evaluated for early Parkinson's syndrome but had not begun any therapies thus far. Has baseline L hand tremor.     Hospital Course (synopsis of major diagnoses, care, treatment, and services provided during the course of the hospital stay): 12/02/2018 S/p tPA. Neurologically better although seems confused. CTH and MRI did not show any evidence of infarct or LVO/stenosis. Discharged on DAPT, statin. Advised tight diabetic control and follow up with Neurology (Patient wants to  follow up with his own neurologist).    Stroke Etiology: Undetermined Cause. Cryptogenic Embolism / ESUS (Embolic Stroke of Undetermined Source)    STROKE DOCUMENTATION   Acute Stroke Times   Last Known Normal Date: 12/01/18  Last Known Normal Time: 1530  Symptom Onset Date: 12/01/18  Symptom Onset Time: 1545  Stroke Team Called Date: 12/01/18  Stroke Team Called Time: 1855  Stroke Team Arrival Date: 12/01/18  Stroke Team Arrival Time: 1859  CT Interpretation Time: 2003     NIH Scale:  1a. Level Of Consciousness: 0-->Alert: keenly responsive  1b. LOC Questions: 2-->Answers neither question correctly  1c. LOC Commands: 0-->Performs both tasks correctly  2. Best Gaze: 0-->Normal  3. Visual: 0-->No visual loss  4. Facial Palsy: 0-->Normal symmetrical movements  5a. Motor Arm, Left: 0-->No drift: limb holds 90 (or 45) degrees for full 10 secs  5b. Motor Arm, Right: 0-->No drift: limb holds 90 (or 45) degrees for full 10 secs  6a. Motor Leg, Left: 0-->No drift: leg holds 30 degree position for full 5 secs  6b. Motor Leg, Right: 0-->No drift: leg holds 30 degree position for full 5 secs  7. Limb Ataxia: 0-->Absent  8. Sensory: 0-->Normal: no sensory loss  9. Best Language: 0-->No aphasia: normal  10. Dysarthria: 0-->Normal  11. Extinction and Inattention (formerly Neglect): 0-->No abnormality  Total (NIH Stroke Scale): 2        Modified Owsley Score: 2  [place] Coma Scale:14   ABCD2 Score:    [id number] Score:   HAS -BLED Score:   ICH Score:   Hunt & Sung Classification:       Assessment/Plan:     Diagnostic Results:    Brain/Vessel Imaging:     CTA Multiphase 12/1/18  No acute intracranial hemorrhage or major vascular distribution infarct.  Redemonstration of a lacunar type infarct within the left periventricular white matter, unchanged dating back to 08/29/2018.  MRI may be attempted for further evaluation.  Redemonstration of generalized cerebral volume loss and chronic microvascular ischemic changes.  No focal  high-grade stenosis, occlusion, or aneurysm involving the intracranial arteries.  Significant tortuosity involving the origin of the left vertebral artery with mild associated narrowing.  Bilateral vertebral arteries demonstrate focal moderate narrowing within the cervical segments with remainder of vertebral circulation within normal limits.     MRI Brain 12/2/18  1. No acute infarct or hemorrhage.  2. Sequela of chronic microvascular ischemic change, senescent change, and remote infarcts.  3. Sinus disease.     Cardiac Imaging      ECHO 12/2/18  · Tachycardia was present during the study  · Concentric left ventricular remodeling.Normal left ventricular systolic function. The estimated ejection fraction is 55%  · Mild left atrial enlargement.  · Indeterminate left ventricular diastolic function. E and A are fused secondary to tachycardia  · Normal right ventricular systolic function.  · Mild mitral sclerosis without stenosis  · Aortic valve sclerosis without stenosis  · Normal central venous pressure (3 mm Hg).          Interventions: IV t-PA    Complications: None    Disposition: Home or Self Care    Final Active Diagnoses:    Diagnosis Date Noted POA    PRINCIPAL PROBLEM:  Transient ischemic attack (TIA) [G45.9] 12/01/2018 Yes    Type 2 diabetes mellitus, with long-term current use of insulin [E11.9, Z79.4] 12/01/2018 Not Applicable    Obesity, unspecified [E66.9] 12/01/2018 Yes    Tissue plasminogen activator (t-PA) administered at other facility within 24 hours prior to current admission [Z92.82] 12/01/2018 Not Applicable    Cerebrovascular accident (CVA) [I63.9] 12/01/2018 Yes      Problems Resolved During this Admission:     No new Assessment & Plan notes have been filed under this hospital service since the last note was generated.  Service: Vascular Neurology      Recommendations:     Post-discharge complication risks: None    Stroke Education given to: patient and family    Follow-up in Stroke Clinic  in 30 days - Patient says he wants to follow up with his own Neurologist.     Discharge Plan:  Antithrombotics: Aspirin 81mg, Clopidogrel 75mg  Statin: Atorvastatin 40mg  Aggresive risk factor modification:  Diabetes  Diet  Exercise  Obesity    Follow Up:  Follow-up Information     Jessica Noel MD In 1 week.    Specialty:  Family Medicine  Why:  Please call to schedule a follow up  Contact information:  [address] [place] [address] [phone]             Neurologist.                 Patient Instructions:   No discharge procedures on file.    Medications:  Reconciled Home Medications:      Medication List      START taking these medications    aspirin 81 MG EC tablet  Commonly known as:  ECOTRIN  Take 1 tablet (81 mg total) by mouth once daily.     atorvastatin 40 MG tablet  Commonly known as:  LIPITOR  Take 1 tablet (40 mg total) by mouth once daily.  Start taking on:  12/3/2018     clopidogrel 75 mg tablet  Commonly known as:  PLAVIX  Take 1 tablet (75 mg total) by mouth once daily.     lisinopril 5 MG tablet  Commonly known as:  PRINIVIL,ZESTRIL  Take 1 tablet (5 mg total) by mouth once daily.  Start taking on:  12/3/2018        CHANGE how you take these medications    glimepiride 2 MG tablet  Commonly known as:  AMARYL  Take 1 tablet (2 mg total) by mouth before breakfast. 2 mg at breakfast, 1 mg at noon and 1 mg at evening meal,  What changed:    · medication strength  · when to take this  · additional instructions        CONTINUE taking these medications    fish oil-omega-3 fatty acids 300-1,000 mg capsule  Take 2 g by mouth once daily.     multivitamin per tablet  Commonly known as:  THERAGRAN  Take 1 tablet by mouth once daily.            Krzysztof Staton MD  Comprehensive Stroke Center  Department of Vascular Neurology   [facility]

## 2018-12-02 NOTE — ED NOTES
Patient transferred from [facility] for neuro evaluation after receiving TPA. Patient was last seen normal at 1540. Patient arrived to [facility] with left sided weakness, left sided facial droop, confusion and slurred speech. Patient received TPA, Bolus of 7.6mg at 1745, Infusion of 69mg at 1746. TPA finished en route with EMS. Upon arrival patient is alert, no left sided deficits and is disoriented to time (knows month, not year).

## 2018-12-02 NOTE — HPI
72 year old male with poorly controlled diabetes and medication non compliance, who was found to have aphasia and R facial weakness yesterday evening witnessed by daughter approx 15 minutes prior to arrival at OSH ED (230pm). tPa given with end time approx 1800. Patient transferred to [facility] for acute stroke management. Symptoms no longer present on arrival to our ED . Non con CT with findings of old infarcts to left hemisphere and CTA w/o LVO.     Of note,family reports patient has had several weeks of confusion and strange behavior as well as LUE tremor which was worked up in outpatient neuro clinic with diagnosis of early parkinsons. Not yet started on PD meds.

## 2018-12-02 NOTE — ASSESSMENT & PLAN NOTE
72 y.o. yo male with PMHx prior stroke (evidence on scan) and DM who presented to [facility] with R facial droop and aphasia that was initially waxing and waning. LKN 1530. Telemedicine with Dr. Lobo; tPA given. Pt was transferred to [facility] for stat CTA MP. Yasmeen Rondon, Cecily reviewed images as acquired. No LVO. Patient not a candidate for IR intervention. Admitted to [facility] for close monitoring/higher level of care.     Per family, pt not compliant with home ASA or diabetic diet; was recently started on insulin (NOT updated in med rec on file.) Pt also recently started on Bactrim for reported diabetic sores on the legs.     Family reports pt has had a cognitive/memory decline over the last year as well; was being evaluated for early Parkinson's syndrome but had not begun any therapies thus far. Has baseline L hand tremor.       Antithrombotics for secondary stroke prevention: Antiplatelets: Aspirin: 81 mg daily  Clopidogrel: 75 mg daily for 30 days and then ASA 81mg to be continued.    Statins for secondary stroke prevention and hyperlipidemia, if present:   Statins: Atorvastatin- 40 mg daily     Aggressive risk factor modification: HTN, DM, HLD, Diet, Exercise, Obesity     Rehab efforts: Occupational Therapy     Diagnostics ordered/pending: None      VTE prophylaxis: None: Reason for No Pharmacological VTE Prophylaxis: Mechanical prophylaxis: Place SCDs     BP parameters: Infarct: Post tPA, SBP <180

## 2018-12-02 NOTE — ASSESSMENT & PLAN NOTE
--end time 1800 on 12/1  --hourly neuro checks in [facility] x 24 hours.  ---MRI   --asa to be started after tpa window completed

## 2018-12-02 NOTE — HOSPITAL COURSE
12/02/2018 S/p tPA. Neurologically better although seems confused. CTH and MRI did not show any evidence of infarct or LVO/stenosis. Discharged on DAPT, statin. Advised tight diabetic control and follow up with Neurology (Patient wants to follow up with his own neurologist).

## 2018-12-02 NOTE — PLAN OF CARE
Problem: Patient Care Overview  Goal: Plan of Care Review  Outcome: Ongoing (interventions implemented as appropriate)  Nutrition assessment completed. Please see RD note for details.    Recommendation/Intervention:   Continue diabetic diet. Pt consuming 100% of meals.   RD provided diabetic education to pt and family. Pt resistant to changes, family accepting. Pt will continue to need reinforcement of importance of diabetes management with both medication and nutrition.     RD to monitor.    Goals: Pt to continue tolerating >50% of meals.  Nutrition Goal Status: new  Communication of RD Recs: reviewed with RN

## 2018-12-02 NOTE — PROGRESS NOTES
[facility]  Vascular Neurology  Comprehensive Stroke Center  Progress Note    Assessment/Plan:     * Transient ischemic attack (TIA)    72 y.o. yo male with PMHx prior stroke (evidence on scan) and DM who presented to [facility] with R facial droop and aphasia that was initially waxing and waning. LKN 1530. Telemedicine with Dr. Lobo; tPA given. Pt was transferred to [facility] for [place] CTA MP. Yasmeen Rondon, Cecily reviewed images as acquired. No LVO. Patient not a candidate for IR intervention. Admitted to [facility] for close monitoring/higher level of care.     Per family, pt not compliant with home ASA or diabetic diet; was recently started on insulin (NOT updated in med rec on file.) Pt also recently started on Bactrim for reported diabetic sores on the legs.     Family reports pt has had a cognitive/memory decline over the last year as well; was being evaluated for early Parkinson's syndrome but had not begun any therapies thus far. Has baseline L hand tremor.       Antithrombotics for secondary stroke prevention: Antiplatelets: Aspirin: 81 mg daily  Clopidogrel: 75 mg daily for 30 days and then ASA 81mg to be continued.    Statins for secondary stroke prevention and hyperlipidemia, if present:   Statins: Atorvastatin- 40 mg daily     Aggressive risk factor modification: HTN, DM, HLD, Diet, Exercise, Obesity     Rehab efforts: Occupational Therapy     Diagnostics ordered/pending: None      VTE prophylaxis: None: Reason for No Pharmacological VTE Prophylaxis: Mechanical prophylaxis: Place SCDs     BP parameters: Infarct: Post tPA, SBP <180     Tissue plasminogen activator (t-PA) administered at other facility within 24 hours prior to current admission    Admitted to [facility] for 24-hr close monitoring       Obesity, unspecified    Stroke risk factor   pt on importance of diet, exercise, lifestyle modifications for secondary stroke prevention     Type 2 diabetes mellitus, with long-term current  use of insulin    Stroke risk factor. Patient is not compliant with his diabetic regimen. Insulin was started around 3-4 months ago and was recently increased as A1c was apparently >10. Continue Amaryl 2mg/1mg/1mg  -- Currently on Amaryl 1mg daily and long acting insulin 30U qdaily  -- A1c on admission 9  -- Advised tight glucose control and regular accuchecks  -- Follow up with PCP.          12/02/2018 S/p tPA. Neurologically better although seems confused. CTH and MRI did not show any evidence of infarct or LVO/stenosis. Discharged on DAPT, statin. Advised tight diabetic control and follow up with Neurology (Patient wants to follow up with his own neurologist).    STROKE DOCUMENTATION   Acute Stroke Times   Last Known Normal Date: 12/01/18  Last Known Normal Time: 1530  Symptom Onset Date: 12/01/18  Symptom Onset Time: 1545  Stroke Team Called Date: 12/01/18  Stroke Team Called Time: 1855  Stroke Team Arrival Date: 12/01/18  Stroke Team Arrival Time: 1859  CT Interpretation Time: 2003    NIH Scale:  1a. Level Of Consciousness: 0-->Alert: keenly responsive  1b. LOC Questions: 2-->Answers neither question correctly  1c. LOC Commands: 0-->Performs both tasks correctly  2. Best Gaze: 0-->Normal  3. Visual: 0-->No visual loss  4. Facial Palsy: 0-->Normal symmetrical movements  5a. Motor Arm, Left: 0-->No drift: limb holds 90 (or 45) degrees for full 10 secs  5b. Motor Arm, Right: 0-->No drift: limb holds 90 (or 45) degrees for full 10 secs  6a. Motor Leg, Left: 0-->No drift: leg holds 30 degree position for full 5 secs  6b. Motor Leg, Right: 0-->No drift: leg holds 30 degree position for full 5 secs  7. Limb Ataxia: 0-->Absent  8. Sensory: 0-->Normal: no sensory loss  9. Best Language: 0-->No aphasia: normal  10. Dysarthria: 0-->Normal  11. Extinction and Inattention (formerly Neglect): 0-->No abnormality  Total (NIH Stroke Scale): 2       Modified Leeanne Score: 2  Vidhi Coma Scale:14   ABCD2 Score:    [id number]  Score:   HAS -BLED Score:   ICH Score:   Hunt & Sung Classification:      Hemorrhagic change of an Ischemic Stroke: Does this patient have an ischemic stroke with hemorrhagic changes? No     Neurologic Chief Complaint: Right facial droop, aphasia.     Subjective:     Interval History: Patient is seen for follow-up neurological assessment and treatment recommendations: Acute onset right facial droop and aphasia that resolved under 24hrs. Was given tPA at OSH and transferred to [facility] [place] for higher level care. CTA showed no LVO      HPI, Past Medical, Family, and Social History remains the same as documented in the initial encounter.     Review of Systems   Constitutional: Negative for fever.   HENT: Negative for trouble swallowing.    Eyes: Negative for visual disturbance.   Respiratory: Negative for shortness of breath.    Cardiovascular: Negative for chest pain.   Gastrointestinal: Negative for abdominal pain.   Neurological: Negative for facial asymmetry, weakness and numbness.   Psychiatric/Behavioral: Positive for confusion. Negative for agitation.     Scheduled Meds:   atorvastatin  40 mg Oral Daily    lisinopril  5 mg Oral Daily    midazolam        ondansetron        senna-docusate 8.6-50 mg  1 tablet Oral Daily    sodium chloride 0.9%  3 mL Intravenous Q8H     Continuous Infusions:  PRN Meds:acetaminophen, dextrose 50%, dextrose 50%, glucagon (human recombinant), glucose, glucose, hydrALAZINE, insulin aspart U-100, midazolam, ondansetron    Objective:     Vital Signs (Most Recent):  Temp: 98.5 °F (36.9 °C) (12/02/18 1600)  Pulse: 109 (12/02/18 1600)  Resp: (!) 25 (12/02/18 1600)  BP: (!) 169/89 (12/02/18 1600)  SpO2: 97 % (12/02/18 1600)  BP Location: Left arm    Vital Signs Range (Last 24H):  Temp:  [97.8 °F (36.6 °C)-98.5 °F (36.9 °C)]   Pulse:  [101-118]   Resp:  [12-27]   BP: (119-206)/()   SpO2:  [94 %-100 %]   BP Location: Left arm    Physical Exam   Constitutional: No distress.   Eyes: Pupils are  equal, round, and reactive to light.   Cardiovascular: Normal rate.   Pulmonary/Chest: Effort normal.   Neurological: He is alert.   Nursing note and vitals reviewed.      Neurological Exam:   LOC: alert  Language: No aphasia  Orientation: Not oriented to place, Not oriented to time  EOM (CN III, IV, VI): Full/intact  Facial Movement (CN VII): Symmetric facial expression    Motor: Arm left  Normal 5/5  Leg left  Normal 5/5  Arm right  Normal 5/5  Leg right Normal 5/5  Cebellar: No evidence of appendicular or axial ataxia  Sensation: Intact to light touch, temperature and vibration    Laboratory:  CMP:   Recent Labs   Lab 12/02/18  0309   CALCIUM 8.5*   ALBUMIN 3.2*   PROT 5.6*      K 3.8   CO2 25      BUN 15   CREATININE 1.0   ALKPHOS 45*   ALT 13   AST 9*   BILITOT 0.4     CBC:   Recent Labs   Lab 12/02/18  0309   WBC 7.77   RBC 4.46*   HGB 13.8*   HCT 39.5*      MCV 89   MCH 30.9   MCHC 34.9     Lipid Panel:   Recent Labs   Lab 12/01/18  2155   CHOL 172   LDLCALC 104.8   HDL 39*   TRIG 141     Coagulation:   Recent Labs   Lab 12/02/18  0309   INR 1.1     Platelet Aggregation Study: No results for input(s): PLTAGG, PLTAGINTERP, PLTAGREGLACO, ADPPLTAGGREG in the last 168 hours.  Hgb A1C:   Recent Labs   Lab 12/01/18  2155   HGBA1C 9.0*     TSH:   Recent Labs   Lab 12/01/18  2155   TSH 1.436       Diagnostic Results     Brain/Vessel Imaging:    CTA Multiphase 12/1/18  No acute intracranial hemorrhage or major vascular distribution infarct.  Redemonstration of a lacunar type infarct within the left periventricular white matter, unchanged dating back to 08/29/2018.  MRI may be attempted for further evaluation.  Redemonstration of generalized cerebral volume loss and chronic microvascular ischemic changes.  No focal high-grade stenosis, occlusion, or aneurysm involving the intracranial arteries.  Significant tortuosity involving the origin of the left vertebral artery with mild associated narrowing.   Bilateral vertebral arteries demonstrate focal moderate narrowing within the cervical segments with remainder of vertebral circulation within normal limits.    MRI Brain 12/2/18  1. No acute infarct or hemorrhage.  2. Sequela of chronic microvascular ischemic change, senescent change, and remote infarcts.  3. Sinus disease.    Cardiac Imaging     ECHO 12/2/18  · Tachycardia was present during the study  · Concentric left ventricular remodeling.Normal left ventricular systolic function. The estimated ejection fraction is 55%  · Mild left atrial enlargement.  · Indeterminate left ventricular diastolic function. E and A are fused secondary to tachycardia  · Normal right ventricular systolic function.  · Mild mitral sclerosis without stenosis  · Aortic valve sclerosis without stenosis  · Normal central venous pressure (3 mm Hg).        Krzysztof Staton MD  Comprehensive Stroke Center  Department of Vascular Neurology   [facility]

## 2018-12-02 NOTE — ASSESSMENT & PLAN NOTE
--ASA following tPa window  --MRI  --atorvastatin   --glycemic control*, education on medication compliance   --A1c 9, recently rx insulin, starting sliding scale for now.   --education on stroke risk factors and medication compliance   --Vascular neuro on board

## 2018-12-02 NOTE — CONSULTS
"  [facility]  Adult Nutrition  Consult Note    SUMMARY     Recommendations    Recommendation/Intervention:   Continue diabetic diet. Pt consuming 100% of meals.   RD provided diabetic education to pt and family. Pt resistant to changes, family accepting. Pt will continue to need reinforcement of importance of diabetes management with both medication and nutrition.     RD to monitor.    Goals: Pt to continue tolerating >50% of meals.  Nutrition Goal Status: new  Communication of RD Recs: reviewed with RN    Reason for Assessment    Reason for Assessment: consult  Diagnosis: stroke/CVA  Relevant Medical History: DN, medication non compliance  General Information Comments: Pt's diet advanced per SLP recommendations. Pt upset that diet order took too much time and he hadn't eaten in > 2 days. RD explained to pt that diabetic education was warranted 2/2 HgbA1c 9.0. Pt reports that he has recently "made changes" and his A1c previously was 10.5. Wife reports these changes as increased insulin and no changes to diet, often consuming entire apple pie, 2nds and 3rds of red beans and rice or spaghetti. RD went over portion sizes of carbohydrates with pt and family members. Pt did resist education at times however family was willing to accept information as wife reports she cooks and shops for food. Pt will continue to need reinforcement. Pt with stable weight x 5 years- no physical signs of malnutrition at this time.  Nutrition Discharge Planning: adequate po intake for optimal nutrition with diabetic restriction    Nutrition Risk Screen    Nutrition Risk Screen: no indicators present    Nutrition/Diet History    Do you have any cultural, spiritual, Moravian conflicts, given your current situation?: none  Factors Affecting Nutritional Intake: None identified at this time    Anthropometrics    Temp: 98.4 °F (36.9 °C)  Height Method: Stated  Height: 5' 7" (170.2 cm)  Height (inches): 67 in  Weight Method: " Bed Scale  Weight: 84.4 kg (186 lb)  Weight (lb): 186 lb  Ideal Body Weight (IBW), Male: 148 lb  % Ideal Body Weight, Male (lb): 125.68 lb  BMI (Calculated): 29.2  BMI Grade: 25 - 29.9 - overweight       Lab/Procedures/Meds    Pertinent Labs Reviewed: reviewed  Pertinent Labs Comments: HgbA1c 9.0, POCT Glu   Pertinent Medications Reviewed: reviewed  Pertinent Medications Comments: insulin, statin    Physical Findings/Assessment    Overall Physical Appearance: nourished, overweight  Skin: intact    Estimated/Assessed Needs    Weight Used For Calorie Calculations: 84.4 kg (186 lb 1.1 oz)  Energy Calorie Requirements (kcal): 1940  Energy Need Method: Novi-St Jeor(PAL 1.25)  Protein Requirements: 85-101g(1.0-1.2g/kg)  Weight Used For Protein Calculations: 84.4 kg (186 lb 1.1 oz)  Fluid Requirements (mL): 1mL/kcal or per MD     RDA Method (mL): 1940  CHO Requirement: 50% of total kcals      Nutrition Prescription Ordered    Current Diet Order: Diabetic    Evaluation of Received Nutrient/Fluid Intake    I/O: -I/O, good UOP, LBM 11/30  % Intake of Estimated Energy Needs: 75 - 100 %  % Meal Intake: 75 - 100 %    Nutrition Risk    Level of Risk/Frequency of Follow-up: (f/u 1x/week)     Assessment and Plan    Nutrition Problem  Altered nutrition related lab values    Related to (etiology):   Unwillingness to apply nutrition information    Signs and Symptoms (as evidenced by):   HgbA1c 9.0    Intervention:  Diabetic education provided    Nutrition Diagnosis Status:   New       Monitor and Evaluation    Food and Nutrient Intake: energy intake, food and beverage intake  Food and Nutrient Adminstration: diet order  Knowledge/Beliefs/Attitudes: food and nutrition knowledge/skill  Anthropometric Measurements: weight, weight change, body mass index  Biochemical Data, Medical Tests and Procedures: electrolyte and renal panel, gastrointestinal profile, glucose/endocrine profile, inflammatory profile, lipid  profile  Nutrition-Focused Physical Findings: overall appearance     Nutrition Follow-Up    RD Follow-up?: Yes

## 2018-12-02 NOTE — ASSESSMENT & PLAN NOTE
Stroke risk factor  A1c pending  Need to confirm pt's home insulin regimen and restart Bactrim (for diabetic LE sores) while admitted  Recommend tight glucose control  SSI while inpatient

## 2018-12-02 NOTE — PLAN OF CARE
12/02/2018      To Santos  [address]  [place] [address]          Vascular Neurology  [facility] [address] [place] [address]  [phone] after hours  [phone] fax Diagnosis:  Stroke due to embolism of left middle cerebral artery                         Debility     Please evaluate and treat for all PT/OT needs        __________________________  Krzysztof Staton MD  12/02/2018

## 2018-12-02 NOTE — PLAN OF CARE
Problem: Physical Therapy Goal  Goal: Physical Therapy Goal  Outcome: Ongoing (interventions implemented as appropriate)  Initial eval completed.  Results, POC, and therapy recommendations discussed with patient and his wife.   Complete evaluation documentation to follow.    Mobility Recommendations: safe to walk with 1 person assistance (after TPA precautions are lifted)  D/c recommendations: OP PT for balance impairment     Polina Bourne, PT  12/2/2018  [phone] (pager)

## 2018-12-02 NOTE — ED NOTES
Family reports that he appears to be getting better without medication. Family noted that he had similar instance of improvement when he was being driven to ED, but had regressed since. Patient appears more alert than in interview with Dr. Lobo. ED physician was made aware and Alteplase still recommended.

## 2018-12-02 NOTE — HPI
To Santos is a 72 y.o. male with PMHx DM who presented as a transfer from [facility] for evaluation of L MCA syndrome. Pt was LKN 1530 when he acutely developed R facial weakness and aphasia while talking with his daughter, though he was still able to ambulate somewhat. CTH at OSH demonstrated no acute abnormalities. Telestroke consult performed by Dr. Lobo and tPA administered. Transferred to [facility] for further evaluation/possible intervention.  On arrival, pt significantly improved; alert, following commands, with no appreciable aphasia. Taken for stat CTA MP.     Per family, denies pt hx blood clots or cardiac arrhythmias; he is supposed to take ASA at home but is reportedly non-compliant. He lives with others and performs most all ADLs independently.  Family reports pt is also non-compliant with diabetic diet, was recently started on insulin in last month (NOT updated in med rec on file.) Pt was also recently started on Bactrim for what was believed to be an infection of the LE skin, associated with DM. Family reports pt has had a cognitive/memory decline over the last year as well; was being evaluated for early Parkinson's syndrome but had not begun any therapies thus far. Has baseline L hand tremor.

## 2018-12-02 NOTE — ASSESSMENT & PLAN NOTE
72 y.o. yo male with PMHx prior stroke (evidence on scan) and DM who presented to [facility] with R facial droop and aphasia that was initially waxing and waning. LKN 1530. Telemedicine with Dr. Lobo; tPA given. Pt was transferred to [facility] for stat CTA MP. Yasmeen Rondon, Cecily reviewed images as acquired. No LVO. Patient not a candidate for IR intervention. Admitted to [facility] for close monitoring/higher level of care.     Per family, pt not compliant with home ASA or diabetic diet; was recently started on insulin (NOT updated in med rec on file.) Pt also recently started on Bactrim for reported diabetic sores on the legs.     Family reports pt has had a cognitive/memory decline over the last year as well; was being evaluated for early Parkinson's syndrome but had not begun any therapies thus far. Has baseline L hand tremor.       Antithrombotics for secondary stroke prevention: Antiplatelets: Aspirin: 81 mg daily  Clopidogrel: 75 mg daily for 30 days and then ASA 81mg to be continued.    Statins for secondary stroke prevention and hyperlipidemia, if present:   Statins: Atorvastatin- 40 mg daily     Aggressive risk factor modification: HTN, DM, HLD, Diet, Exercise, Obesity     Rehab efforts: Occupational Therapy     Diagnostics ordered/pending: None      VTE prophylaxis: None: Reason for No Pharmacological VTE Prophylaxis: Mechanical prophylaxis: Place SCDs     BP parameters: Infarct: Post tPA, SBP <180

## 2018-12-02 NOTE — H&P
[facility]  Neurocritical Care  History & Physical    Admit Date: 12/1/2018  Service Date: 12/02/2018  Length of Stay: 1    Subjective:     Chief Complaint: Stroke due to embolism of left middle cerebral artery    History of Present Illness: 72 year old male with poorly controlled diabetes and medication non compliance, who was found to have aphasia and R facial weakness yesterday evening witnessed by daughter approx 15 minutes prior to arrival at OSH ED (230pm). tPa given with end time approx 1800. Patient transferred to [facility] for acute stroke management. Symptoms no longer present on arrival to our ED . Non con CT with findings of old infarcts to left hemisphere and CTA w/o LVO.     Of note,family reports patient has had several weeks of confusion and strange behavior as well as LUE tremor which was worked up in outpatient neuro clinic with diagnosis of early parkinsons. Not yet started on PD meds.     Past Medical History:   Diagnosis Date    Basal cell carcinoma     Diabetes mellitus      Past Surgical History:   Procedure Laterality Date    amputation right great toe      APPLICATION, GRAFT, SKIN, FULL-THICKNESS N/A 12/18/2013    Performed by Rafa Nguyen MD at [facility] OR 2ND FLR    AURICULECTOMY Left 11/27/2013    Performed by [facility] at [facility] OR [facility]    RECONSTRUCTION USING FLAP Left 11/27/2013    [facility]    REVISION, PROCEDURE INVOLVING FLAP GRAFT Left 12/18/2013    [facility]       Current Facility-Administered Medications on File Prior to Encounter   Medication Dose Route Frequency Provider Last Rate Last Dose    [COMPLETED] alteplase (ACTIVASE) injection 69 mg  69 mg Intravenous Once Migel Jameson MD 69 mL/hr at 12/01/18 1746 69 mg at 12/01/18 1746    [COMPLETED] alteplase injection 7.6 mg  7.6 mg Other Once Migel Jameson MD   7.6 mg at 12/01/18 1747     [COMPLETED] hydrALAZINE injection 10 mg  10 mg Intravenous ED 1 Time Migel Jameson MD   10 mg at 12/01/18 1703    [DISCONTINUED] alteplase (ACTIVASE) 100 mg injection 69 mg  69 mg Intravenous ED 1 Time Migel Jameson MD        [DISCONTINUED] alteplase (ACTIVASE) injection 69 mg  69 mg Intravenous Once Migel Jameson MD        [DISCONTINUED] niCARdipine 40 mg/200 mL infusion  2.5 mg/hr Intravenous Continuous Migel Jameson MD 12.5 mL/hr at 12/01/18 1811 2.5 mg/hr at 12/01/18 1811     Current Outpatient Medications on File Prior to Encounter   Medication Sig Dispense Refill    fish oil-omega-3 fatty acids 300-1,000 mg capsule Take 2 g by mouth once daily.      glimepiride (AMARYL) 1 MG tablet Take 2 mg by mouth. 2 mg at breakfast, 1 mg at noon and 1 mg at evening meal      multivitamin (THERAGRAN) per tablet Take 1 tablet by mouth once daily.       Allergies: Tetanus vaccines and toxoid    History reviewed. No pertinent family history.    Social History     Tobacco Use    Smoking status: Never Smoker    Smokeless tobacco: Never Used   Substance Use Topics    Alcohol use: Yes     Alcohol/week: 0.5 oz     Types: 1 Standard drinks or equivalent per week    Drug use: No      Review of Systems:Constitutional: Denies fevers or chills.  Pulmonary: Denies shortness of breath or cough.  Cardiology: Denies chest pain or palpitations.  GI: Denies abdominal pain or constipation.  Neurologic: Denies new weakness, headache, or paresthesias.    Vitals:   Temp: 98 °F (36.7 °C)  Pulse: 104  Rhythm: sinus tachycardia  BP: (!) 194/84  MAP (mmHg): 121  Resp: 17  SpO2: 95 %  O2 Device (Oxygen Therapy): room air    Temp  Min: 97.8 °F (36.6 °C)  Max: 98.3 °F (36.8 °C)  Pulse  Min: 98  Max: 118  BP  Min: 119/64  Max: 206/98  MAP (mmHg)  Min: 83  Max: 142  Resp  Min: 13  Max: 27  SpO2  Min: 94 %  Max: 100 %    12/01 0701 - 12/02 0700  In: 359.2 [I.V.:359.2]  Out: 900 [Urine:900]   Unmeasured Output  Urine  Occurrence: 1  Stool Occurrence: 0  Pad Count: 0     Examination:   Constitutional: Well-nourished and -developed. No apparent distress.   Eyes: Conjunctiva clear, anicteric. Lids no lesions.  Head/Ears/Nose/Mouth/Throat/Neck: Moist mucous membranes. External ears, nose atraumatic.   Cardiovascular: Regular rhythm. No murmurs. No leg edema.  Respiratory: Comfortable respirations. Clear to auscultation.  Gastrointestinal: No hernia. Soft, nondistended, nontender. + bowel sounds.    Neurologic:   15  -Alert. Oriented to person, place, and time. Speech fluent. Follows commands. Recent and remote memory adequate. Judgment good. Insight appropriate.  -Cranial nerves intact, particularly V, VII  -Strength 5/5 and symmetric in arms, legs. Tone normal.   -Sensation =intact to touch in arms, legs.      Today I independently reviewed pertinent medications, imaging, :   Assessment/Plan:     Neuro   * Stroke due to embolism of left middle cerebral artery    --ASA following tPa window  --MRI  --atorvastatin   --glycemic control*, education on medication compliance   --A1c 9, recently rx insulin, starting sliding scale for now.   --education on stroke risk factors and medication compliance   --Vascular neuro on board      Hematology   Tissue plasminogen activator (t-PA) administered at other facility within 24 hours prior to current admission    --end time 1800 on 12/1  --hourly neuro checks in [facility] x 24 hours.  ---MRI   --asa to be started after tpa window completed          Endocrine   Type 2 diabetes mellitus, with long-term current use of insulin    --SSI  --hold glimepride for now            The patient is being Prophylaxed for:  Venous Thromboembolism with: Mechanical  Stress Ulcer with: Not Applicable   Ventilator Pneumonia with: not applicable    Activity Orders          None        Full Code    Karine Herring PA-C  Neurocritical Care  [facility]

## 2018-12-03 DIAGNOSIS — E11.8 TYPE 2 DIABETES MELLITUS WITH COMPLICATION, WITH LONG-TERM CURRENT USE OF INSULIN: ICD-10-CM

## 2018-12-03 DIAGNOSIS — Z79.4 TYPE 2 DIABETES MELLITUS WITH COMPLICATION, WITH LONG-TERM CURRENT USE OF INSULIN: ICD-10-CM

## 2018-12-03 DIAGNOSIS — G45.9 TRANSIENT ISCHEMIC ATTACK (TIA): Primary | ICD-10-CM

## 2018-12-03 RX ORDER — GLIMEPIRIDE 2 MG/1
2 TABLET ORAL
Qty: 90 TABLET | Refills: 3 | Status: SHIPPED | OUTPATIENT
Start: 2018-12-03 | End: 2021-08-13 | Stop reason: DRUGHIGH

## 2018-12-03 RX ORDER — FOLIC ACID 1 MG/1
1 TABLET ORAL DAILY
Qty: 30 TABLET | Refills: 1 | Status: SHIPPED | OUTPATIENT
Start: 2018-12-03 | End: 2021-08-13

## 2018-12-04 ENCOUNTER — PATIENT OUTREACH (OUTPATIENT)
Dept: ADMINISTRATIVE | Facility: CLINIC | Age: 73
End: 2018-12-04

## 2018-12-04 NOTE — PATIENT INSTRUCTIONS
For Caregivers: Preventing Another Stroke    Having had a stroke, your loved one is at higher risk of having another. Medication, exercise, and diet are keys to reducing this risk. Your loved one needs to be active each day now. Walking is a good way to get daily exercise. You might also ask the doctor to refer you to a dietitian. This specialist in nutrition can help you reduce many common risk factors for stroke. Quitting smoking is also critical to reduce risk of stroke. Talk with your loved one about quitting smoking. Ask his or her healthcare provider for help.   Taking medicine  Your loved one may take more than 1 type of medicine. Each must be used as directed. If medicines include a blood thinner, your loved one may need regular blood tests.  Tips for safe medicine use  · Make sure medicines are taken on schedule. If timing is vital, set an alarm.  · Keep pill doses in a divided tray. A pill box can help.   · Know which foods or liquids the patient should avoid while taking prescribed medicines.  Reducing the risk of stroke  Many factors that increase the risk of stroke can be reduced. Your loved ones doctor and a dietitian can advise ways to:  · Lower high blood pressure  · Improve cholesterol  · Control heart disease  · Manage diabetes  · Lose excess weight  · Reduce risk of blood clots by taking blood thinners as recommended by the healthcare provider  · Stay active with aerobic and muscle-strengthening exercises  Warning signs of another stroke  If your loved one suddenly has any of the problems below, call 911 immediately for emergency medical help:  · Numbness or weakness of the face, arms, or legs, especially on 1 side  · Confusion or trouble speaking or understanding  · Trouble seeing in 1 or both eyes  · Trouble walking, dizziness, loss of balance or coordination  · Severe headache with no known cause  · Loss of consciousness or a seizure   F.A.S.T. is an easy way to remember the signs of a  stroke. When you see these signs, you will know that you need to call 911 fast. F.A.S.T. stands for:  · F is for face drooping - One side of the face is drooping or numb. When the person smiles, the smile is uneven.  · A is for arm weakness - One arm is weak or numb. When the person lifts both arms at the same time, one arm may drift downward.  · S is for speech difficulty -  You may notice slurred speech or difficulty speaking. The person can't repeat a simple sentence correctly when asked.  · T is for time to call 911 - If someone shows any of these symptoms, even if they go away, call 911 immediately. Make note of the time the symptoms first appeared.   Date Last Reviewed: 8/26/2015 © 2000-2017 [employer]. [address] [place] [address]. All rights reserved. This information is not intended as a substitute for professional medical care. Always follow your healthcare professional's instructions.        Symptoms of a Stroke     A sudden feeling of weakness on one side of your body may be a sign that you are having a stroke.   During a stroke, blood stops flowing to part of the brain. This can damage areas in the brain that control the rest of the body. Get help right away if any of these symptoms come on suddenly, even if the symptoms dont last.  Know the symptoms of a stroke  · Weakness. You may feel a sudden weakness, tingling, or a loss of feeling on 1 side of your face or body including your arm or leg.   · Vision problems. You may have sudden double vision or trouble seeing in 1 or both eyes.  · Speech problems. You may have sudden trouble talking, slurred speech, or problems understanding others.  · Headache. You may have a sudden, severe headache.  · Movement problems. You may have sudden trouble walking, dizziness, a feeling of spinning, a loss of balance, a feeling of falling, or blackouts.  · Seizure. You may also have a seizure with a large or hemorrhagic stroke.   Remember:  If you have any of these symptoms, call 911 and your doctor as soon as possible.  F.A.S.T. is an easy way to remember the signs of a stroke. When you see these signs, you will know that you need to call 911 fast.   F.A.S.T. stands for:  · F is for face drooping - One side of the face is drooping or numb. When the person smiles, the smile is uneven.  · A is for arm weakness - One arm is weak or numb. When the person lifts both arms at the same time, one arm may drift downward.  · S is for speech difficulty - You may notice slurred speech or difficulty speaking. The person can't repeat a simple sentence correctly when asked.  · T is for time to dial 911 - If someone shows any of these symptoms, even if they go away, call 911 immediately. Make note of the time the symptoms first appeared.   Date Last Reviewed: 8/26/2015  © 5600-5682 [employer]. [address] [place] [address]. All rights reserved. This information is not intended as a substitute for professional medical care. Always follow your healthcare professional's instructions.        Using a Blood Sugar Log    You have diabetes. This means your body has trouble regulating a sugar called glucose. To help manage your diabetes, youll need to check your blood sugar level as directed by your healthcare provider. Keeping a log of your blood sugar levels will help you track your blood sugar readings. Its a simple and easy way to see how well you are controlling your diabetes.  Checking your blood sugar level  You can check your blood sugar level with a blood glucose meter. Youll first prick the side of your finger with a tiny lancet to draw a tiny drop of blood onto the test strip. Some glucose meters let you use another place on your body to test. But these other places should not be used in some cases as they may be inaccurate. Follow the instructions for your glucose meter. And talk with your healthcare provider before doing the test on  other places.  The strip goes into the meter first, then a drop of blood is placed on the tip of the strip. The meter then shows a reading that tells you the level of your blood sugar. Your readings should be in your target range as often as possible. This means not too high or too low. Staying in this range helps lower your risk for complications. Your healthcare provider will help you figure out the target range that is best for you.  Tracking your readings  Every time you check your blood sugar, use your log to keep track of your readings. Your meter will also probably have a memory feature that your healthcare provider can check at your next visit. You may be advised by your healthcare provider to check your blood sugar in the morning, at bedtime, and before and after meals. Be sure to write down all of your numbers. Also use your log to record things that might have affected your blood sugar. Some examples include being sick, certain medicines, being physically active, feeling stressed, or skipping meals.   Lessons learned from your readings  Tracking your blood sugar readings helps you see patterns. These patterns tell you how your actions affect your blood sugar. For instance, you may have higher numbers after eating certain foods or lower numbers after exercise. They just help you understand how to stay in your target range more often, so that your diabetes remains in good control.  Sharing your log with your healthcare team  Bring your blood sugar log and glucose meter with you to all of your healthcare appointments. This can help your healthcare team make changes to your treatment plan, if needed. This may involve making changes in what you eat, what medicines you take, or how much you exercise.  To learn more  The resources below can help you learn more:  · American Diabetes Association 605-645-3384 www.diabetes.org  · [employer] 195-639-6035 www.lighthouse.org  · National [employer]  [employer] 824-922-5678 www.nei.[facility].gov  · [employer] 477-906-2460 www.hormone.org  Date Last Reviewed: 5/1/2016 © 2000-2017 [employer]. [address] [place] [address], [place] [address]. All rights reserved. This information is not intended as a substitute for professional medical care. Always follow your healthcare professional's instructions.        Long-Term Complications of Diabetes    Diabetes can cause health problems over time. These are called complications. They are more likely to happen if your blood sugar is often too high. Over time, high blood sugar can damage blood vessels in your body. It is important to keep your blood sugar in your target range. This can help prevent or delay complications from diabetes.  Possible complications  Complications of diabetes include:  · Eye problems, including damage to the blood vessels in the eyes (retinopathy), pressure in the eye (glaucoma), and clouding of the eyes lens (a cataract). Eye problems can eventually lead to irreversible blindness.   · Tooth and gum problems (periodontal disease), causing loss of teeth and bone  · Blood vessel (vascular) disease leading to circulation problems, heart attack or stroke, or a need for amputation of a limb   · Problems with sexual function leading to erectile dysfunction in men and sexual discomfort in women   · Kidney disease (nephropathy) can eventually lead to kidney failure, which may require dialysis or kidney transplant   · Nerve problems (neuropathy), causing pain or loss of feeling in your feet and other parts of your body, potentially leading to an amputation of a limb   · High blood pressure (hypertension), putting strain on your heart and blood vessels  · Serious infections, possibly leading to loss of toes, feet, or limbs  How to avoid complications  The serious consequences of these complications may be avoidable for most people with diabetes by managing your blood glucose, blood pressure,  and cholesterol levels. This can help you feel better and stay healthy. You can manage diabetes by tracking your blood sugar. You can also eat healthy and exercise to avoid gaining weight. And you should take medicine if directed by your healthcare provider.  Date Last Reviewed: 5/1/2016  © 7607-9086 [employer]. [address] [place] [address], [place] [address]. All rights reserved. This information is not intended as a substitute for professional medical care. Always follow your healthcare professional's instructions.

## 2018-12-04 NOTE — PT/OT/SLP DISCHARGE
Physical Therapy Discharge Summary    Name: To Santos  MRN: [id number]   Principal Problem: Transient ischemic attack (TIA)     Patient Discharged from acute Physical Therapy on 12/2/2018.  Please refer to prior PT noted date on 12/2/2018 for functional status.     Assessment:     Patient was discharged unexpectedly.  Information required to complete an accurate discharge summary is unknown.  Refer to therapy initial evaluation and last progress note for initial and most recent functional status and goal achievement.  Recommendations made may be found in medical record.    Objective:     GOALS:   Multidisciplinary Problems     Physical Therapy Goals        Problem: Physical Therapy Goal    Goal Priority Disciplines Outcome Goal Variances Interventions   Physical Therapy Goal     PT, PT/OT Ongoing (interventions implemented as appropriate)     Description:    Goals to be met by 12/12/2018    1. Pt will perform sit to stand transfers with independence.    2. Pt will perform bed <> chair transfers with independence.  3. Pt will perform gait x 150 feet with independence and no assistive device.  4.Patient will ascend and descend 5 steps with SBA and no rails to safely access home environment.                     Reasons for Discontinuation of Therapy Services  Transfer to alternate level of care.      Plan:     Patient Discharged to: Outpatient Therapy Services.    Polina Bourne, PT  12/4/2018

## 2019-04-09 ENCOUNTER — TELEPHONE (OUTPATIENT)
Dept: WOUND CARE | Facility: CLINIC | Age: 74
End: 2019-04-09

## 2019-04-09 NOTE — TELEPHONE ENCOUNTER
----- Message from Migel Rubio sent at 4/8/2019  1:10 PM CDT -----  Contact: Pt's wife  Needs Advice    Reason for call: The Pt's wife states that her  has a blister on his left big toe due to diabetes and that he has lost his right toe already.  She would like to be seen this week sometime if you have anything available.        Communication Preference:[phone]  Additional Information:

## 2019-06-02 ENCOUNTER — HOSPITAL ENCOUNTER (EMERGENCY)
Facility: HOSPITAL | Age: 74
End: 2019-06-03
Attending: FAMILY MEDICINE
Payer: MEDICARE

## 2019-06-02 DIAGNOSIS — I82.621 ACUTE DEEP VEIN THROMBOSIS (DVT) OF RIGHT UPPER EXTREMITY, UNSPECIFIED VEIN: Primary | ICD-10-CM

## 2019-06-02 PROCEDURE — 99285 EMERGENCY DEPT VISIT HI MDM: CPT | Mod: 25,ER

## 2019-06-02 RX ORDER — CLONIDINE HYDROCHLORIDE 0.1 MG/1
0.1 TABLET ORAL
Status: COMPLETED | OUTPATIENT
Start: 2019-06-03 | End: 2019-06-03

## 2019-06-02 RX ORDER — ENOXAPARIN SODIUM 100 MG/ML
1 INJECTION SUBCUTANEOUS
Status: COMPLETED | OUTPATIENT
Start: 2019-06-03 | End: 2019-06-03

## 2019-06-03 VITALS
BODY MASS INDEX: 28.25 KG/M2 | TEMPERATURE: 98 F | OXYGEN SATURATION: 99 % | HEART RATE: 98 BPM | WEIGHT: 180 LBS | DIASTOLIC BLOOD PRESSURE: 114 MMHG | SYSTOLIC BLOOD PRESSURE: 203 MMHG | HEIGHT: 67 IN | RESPIRATION RATE: 19 BRPM

## 2019-06-03 LAB
ALBUMIN SERPL BCP-MCNC: 4.1 G/DL (ref 3.5–5.2)
ALP SERPL-CCNC: 78 U/L (ref 38–126)
ALT SERPL W/O P-5'-P-CCNC: 31 U/L (ref 10–44)
ANION GAP SERPL CALC-SCNC: 8 MMOL/L (ref 8–16)
APTT BLDCRRT: 34.5 SEC (ref 21–32)
AST SERPL-CCNC: 17 U/L (ref 15–46)
BASOPHILS # BLD AUTO: 0.06 K/UL (ref 0–0.2)
BASOPHILS NFR BLD: 1 % (ref 0–1.9)
BILIRUB SERPL-MCNC: 0.4 MG/DL (ref 0.1–1)
BUN SERPL-MCNC: 21 MG/DL (ref 2–20)
CALCIUM SERPL-MCNC: 9.9 MG/DL (ref 8.7–10.5)
CHLORIDE SERPL-SCNC: 101 MMOL/L (ref 95–110)
CO2 SERPL-SCNC: 33 MMOL/L (ref 23–29)
CREAT SERPL-MCNC: 1.15 MG/DL (ref 0.5–1.4)
DIFFERENTIAL METHOD: ABNORMAL
EOSINOPHIL # BLD AUTO: 0.5 K/UL (ref 0–0.5)
EOSINOPHIL NFR BLD: 8.2 % (ref 0–8)
ERYTHROCYTE [DISTWIDTH] IN BLOOD BY AUTOMATED COUNT: 14.7 % (ref 11.5–14.5)
EST. GFR  (AFRICAN AMERICAN): >60 ML/MIN/1.73 M^2
EST. GFR  (NON AFRICAN AMERICAN): >60 ML/MIN/1.73 M^2
GLUCOSE SERPL-MCNC: 186 MG/DL (ref 70–110)
HCT VFR BLD AUTO: 39.9 % (ref 40–54)
HGB BLD-MCNC: 13.3 G/DL (ref 14–18)
INR PPP: 1.3 (ref 0.8–1.2)
LYMPHOCYTES # BLD AUTO: 1.4 K/UL (ref 1–4.8)
LYMPHOCYTES NFR BLD: 24.1 % (ref 18–48)
MCH RBC QN AUTO: 29.8 PG (ref 27–31)
MCHC RBC AUTO-ENTMCNC: 33.3 G/DL (ref 32–36)
MCV RBC AUTO: 89 FL (ref 82–98)
MONOCYTES # BLD AUTO: 0.9 K/UL (ref 0.3–1)
MONOCYTES NFR BLD: 14.8 % (ref 4–15)
NEUTROPHILS # BLD AUTO: 3 K/UL (ref 1.8–7.7)
NEUTROPHILS NFR BLD: 51.9 % (ref 38–73)
PLATELET # BLD AUTO: 147 K/UL (ref 150–350)
PMV BLD AUTO: 10.8 FL (ref 9.2–12.9)
POTASSIUM SERPL-SCNC: 4.6 MMOL/L (ref 3.5–5.1)
PROT SERPL-MCNC: 7.3 G/DL (ref 6–8.4)
PROTHROMBIN TIME: 13.8 SEC (ref 9–12.5)
RBC # BLD AUTO: 4.47 M/UL (ref 4.6–6.2)
SODIUM SERPL-SCNC: 142 MMOL/L (ref 136–145)
WBC # BLD AUTO: 5.82 K/UL (ref 3.9–12.7)

## 2019-06-03 PROCEDURE — 96374 THER/PROPH/DIAG INJ IV PUSH: CPT | Mod: ER

## 2019-06-03 PROCEDURE — 96372 THER/PROPH/DIAG INJ SC/IM: CPT | Mod: 59,ER

## 2019-06-03 PROCEDURE — 85730 THROMBOPLASTIN TIME PARTIAL: CPT | Mod: ER

## 2019-06-03 PROCEDURE — 25000003 PHARM REV CODE 250: Mod: ER | Performed by: FAMILY MEDICINE

## 2019-06-03 PROCEDURE — 63600175 PHARM REV CODE 636 W HCPCS: Mod: ER | Performed by: FAMILY MEDICINE

## 2019-06-03 PROCEDURE — 85610 PROTHROMBIN TIME: CPT | Mod: ER

## 2019-06-03 PROCEDURE — 80053 COMPREHEN METABOLIC PANEL: CPT | Mod: ER

## 2019-06-03 PROCEDURE — 85025 COMPLETE CBC W/AUTO DIFF WBC: CPT | Mod: ER

## 2019-06-03 RX ADMIN — CLONIDINE HYDROCHLORIDE 0.1 MG: 0.1 TABLET ORAL at 12:06

## 2019-06-03 RX ADMIN — PIPERACILLIN AND TAZOBACTAM 4.5 G: 4; .5 INJECTION, POWDER, LYOPHILIZED, FOR SOLUTION INTRAVENOUS; PARENTERAL at 12:06

## 2019-06-03 RX ADMIN — ENOXAPARIN SODIUM 80 MG: 100 INJECTION SUBCUTANEOUS at 12:06

## 2019-06-03 NOTE — ED NOTES
[facility] called. Patricia said she will call back after speaking to [facility] Transfer Line/Physician.

## 2019-06-03 NOTE — ED PROVIDER NOTES
Encounter Date: 6/2/2019       History     Chief Complaint   Patient presents with    Vascular Access Problem     Wife stated she noticed swelling to right arm where below where picc line is located since Saturday. Swelling and redness noted to right arm. Home health nurse took blood from right hand Friday because the PICC line did not pull back. Patient gets antibiotics 5x per day with Heparin lock after infusion. No complaints of pain at site or to right arm. Pulse present weak and thready, extermity warm, capillary refill slow.      Patient is a 73-year-old male who presents with swelling to the right forearm.  Onset of symptoms was 2 days ago.  Patient has a PICC line placed to the right upper arm that was placed last month.  Wife states patient needed blood work drawn 2 days ago and set of drawing from the PICC line the blood was drawn from the right forearm.  Wife states the swelling started at that point has not improved.  Patient denies any pain, numbness or tingling at this time.        Review of patient's allergies indicates:   Allergen Reactions    Tetanus vaccines and toxoid      Past Medical History:   Diagnosis Date    Basal cell carcinoma     Diabetes mellitus      Past Surgical History:   Procedure Laterality Date    amputation right great toe      APPLICATION, GRAFT, SKIN, FULL-THICKNESS N/A 12/18/2013    Performed by [facility] at [facility] OR 2ND [facility]    AURICULECTOMY Left 11/27/2013    Performed by [facility] at [facility] OR 2ND [facility]    RECONSTRUCTION USING FLAP Left 11/27/2013    Performed by [facility] at [facility] OR 2ND [facility]    REVISION, PROCEDURE INVOLVING FLAP GRAFT Left 12/18/2013    Performed by [facility] at [facility]     History reviewed. No pertinent family history.  Social History     Tobacco Use    Smoking status: Never Smoker    Smokeless tobacco: Never Used   Substance Use Topics    Alcohol use: Yes     Alcohol/week: 0.5 oz      Types: 1 Standard drinks or equivalent per week    Drug use: No     Review of Systems   Respiratory: Negative for shortness of breath.    Cardiovascular: Negative for chest pain.   Musculoskeletal:        Swelling to the right forearm   All other systems reviewed and are negative.      Physical Exam     Initial Vitals [06/02/19 2138]   BP Pulse Resp Temp SpO2   (!) 175/98 101 19 98.3 °F (36.8 °C) 97 %      MAP       --         Physical Exam    Nursing note and vitals reviewed.  Constitutional: He appears well-developed and well-nourished.   HENT:   Head: Normocephalic and atraumatic.   Eyes: Conjunctivae and EOM are normal. Pupils are equal, round, and reactive to light.   Neck: Normal range of motion. Neck supple.   Cardiovascular: Normal rate, regular rhythm and normal heart sounds.   Diminished radial pulses bilaterally   Pulmonary/Chest: Breath sounds normal.   Abdominal: Soft. Bowel sounds are normal.   Musculoskeletal: Normal range of motion. He exhibits edema (Right forearm).   Neurological: He is alert and oriented to person, place, and time. He has normal strength and normal reflexes.   Skin: Skin is warm and dry. Capillary refill takes less than 2 seconds.   Psychiatric: He has a normal mood and affect. His behavior is normal. Judgment and thought content normal.         ED Course   Procedures  Labs Reviewed - No data to display       Imaging Results          US Upper Extremity Veins Right (Final result)  Result time 06/02/19 23:09:25    Final result by Tommy oMntiel MD (06/02/19 23:09:25)                 Impression:      Extensive thrombus from the insertion site of the PICC line in the brachial vein to the subclavian veins including the axillary vein.  Findings consistent with DVT of the upper extremity.  No thrombus is identified in the jugular vein.      Electronically signed by: Tommy Montiel MD  Date:    06/02/2019  Time:    23:09             Narrative:    EXAMINATION:  US UPPER EXTREMITY VEINS  RIGHT    CLINICAL HISTORY:  right arm swelling;    TECHNIQUE:  Duplex and color flow Doppler evaluation and dynamic compression was performed of the right upper extremity veins.    COMPARISON:  None    FINDINGS:  PICC line present inserted in the brachial vein of the right arm.  There is thrombus present from the insertion point in the brachial vein along the brachial vein extending into the axillary and subclavian veins.                                 Medical Decision Making:   Initial Assessment:   Presents with right upper arm swelling after PICC line placement.  Differential Diagnosis:   DVT of upper extremity, SVT of upper extremity, PICC line infiltration, cellulitis.  Clinical Tests:   Lab Tests: Ordered and Reviewed  Radiological Study: Ordered and Reviewed  ED Management:  Right upper extremity with significant occlusion from basilic, cephalic, axillary and subclavian vein.  Patient has been anticoagulated with Lovenox.  Patient needs admission for extensive thromboembolism in his right upper extremity.  Patient will be transferred to [facility] as per hospitalist.  For further evaluation and treatment.              Attending Attestation:     Physician Attestation Statement for NP/PA:   I have conducted a face to face encounter with this patient in addition to the NP/PA, due to Medical Complexity    Other NP/PA Attestation Additions:    History of Present Illness: 73-year-old male with history of osteomyelitis of left great toe, received PICC line in his right upper arm last month.  He has been receiving vancomycin, Zosyn.  Complains of significant swelling in his right upper arm.  Wife was able to infuse antibiotics.  Home health nurse advised them to follow up ER for evaluation.  Patient denies shortness of breath, cough or fever.   Physical Exam: Right upper extremity significant swelling starting from PICC line area to forearm.  Mild tenderness around the PICC line area.  Bilateral good air entry  and expansion no wheezing or cramps.  Abdomen is soft nontender nondistended bowel sounds heard.  Left foot with dressing for osteomyelitis of left great toe.   Medical Decision Making: Address been extensive DVT of right basilic, axillary, subclavian vein.  Patient will be started on Lovenox 81.6 mg subcu was dose now.  He will be administered his antibiotics by peripheral IV.  He needs to be admitted for further evaluation of his hypercoagulability and continue anticoagulation.  Patient had his care at [facility] where he prefers to go to.  Will transfer patient by EMS to [facility].  Family notified.  Discuss with  who advised to leave the PICC line. And anticoagulate and transfer to [facility].                    Clinical Impression:       ICD-10-CM ICD-9-CM   1. Acute deep vein thrombosis (DVT) of right upper extremity, unspecified vein I82.621 453.82         Disposition:   Disposition: Transferred  Condition: Serious                        Tomas Costa MD  06/03/19 0650

## 2019-07-12 ENCOUNTER — HOSPITAL ENCOUNTER (EMERGENCY)
Facility: HOSPITAL | Age: 74
Discharge: HOME | End: 2019-07-12
Attending: FAMILY MEDICINE
Payer: MEDICARE

## 2019-07-12 VITALS
HEIGHT: 67 IN | HEART RATE: 120 BPM | BODY MASS INDEX: 26.84 KG/M2 | WEIGHT: 171 LBS | SYSTOLIC BLOOD PRESSURE: 104 MMHG | OXYGEN SATURATION: 96 % | DIASTOLIC BLOOD PRESSURE: 59 MMHG | RESPIRATION RATE: 17 BRPM | TEMPERATURE: 98 F

## 2019-07-12 DIAGNOSIS — I95.1 ORTHOSTATIC HYPOTENSION: Primary | ICD-10-CM

## 2019-07-12 DIAGNOSIS — D72.829 LEUKOCYTOSIS: ICD-10-CM

## 2019-07-12 LAB
ALBUMIN SERPL BCP-MCNC: 3.8 G/DL (ref 3.5–5.2)
ALP SERPL-CCNC: 79 U/L (ref 38–126)
ALT SERPL W/O P-5'-P-CCNC: 19 U/L (ref 10–44)
ANION GAP SERPL CALC-SCNC: 10 MMOL/L (ref 8–16)
AST SERPL-CCNC: 14 U/L (ref 15–46)
BACTERIA #/AREA URNS AUTO: NORMAL /HPF
BASOPHILS # BLD AUTO: 0.02 K/UL (ref 0–0.2)
BASOPHILS NFR BLD: 0.1 % (ref 0–1.9)
BILIRUB SERPL-MCNC: 0.7 MG/DL (ref 0.1–1)
BILIRUB UR QL STRIP: NEGATIVE
BUN SERPL-MCNC: 37 MG/DL (ref 2–20)
CALCIUM SERPL-MCNC: 9.1 MG/DL (ref 8.7–10.5)
CHLORIDE SERPL-SCNC: 102 MMOL/L (ref 95–110)
CK SERPL-CCNC: 24 U/L (ref 55–170)
CLARITY UR REFRACT.AUTO: CLEAR
CO2 SERPL-SCNC: 26 MMOL/L (ref 23–29)
COLOR UR AUTO: YELLOW
CREAT SERPL-MCNC: 1.31 MG/DL (ref 0.5–1.4)
DIFFERENTIAL METHOD: ABNORMAL
EOSINOPHIL # BLD AUTO: 0 K/UL (ref 0–0.5)
EOSINOPHIL NFR BLD: 0.1 % (ref 0–8)
ERYTHROCYTE [DISTWIDTH] IN BLOOD BY AUTOMATED COUNT: 14.3 % (ref 11.5–14.5)
EST. GFR  (AFRICAN AMERICAN): >60 ML/MIN/1.73 M^2
EST. GFR  (NON AFRICAN AMERICAN): 53.6 ML/MIN/1.73 M^2
GLUCOSE SERPL-MCNC: 111 MG/DL (ref 70–110)
GLUCOSE UR QL STRIP: NEGATIVE
HCT VFR BLD AUTO: 33.1 % (ref 40–54)
HGB BLD-MCNC: 10.9 G/DL (ref 14–18)
HGB UR QL STRIP: ABNORMAL
HYALINE CASTS UR QL AUTO: 0 /LPF
KETONES UR QL STRIP: NEGATIVE
LEUKOCYTE ESTERASE UR QL STRIP: NEGATIVE
LYMPHOCYTES # BLD AUTO: 1 K/UL (ref 1–4.8)
LYMPHOCYTES NFR BLD: 6.8 % (ref 18–48)
MCH RBC QN AUTO: 29 PG (ref 27–31)
MCHC RBC AUTO-ENTMCNC: 32.9 G/DL (ref 32–36)
MCV RBC AUTO: 88 FL (ref 82–98)
MICROSCOPIC COMMENT: NORMAL
MONOCYTES # BLD AUTO: 1.1 K/UL (ref 0.3–1)
MONOCYTES NFR BLD: 7.9 % (ref 4–15)
NEUTROPHILS # BLD AUTO: 12.2 K/UL (ref 1.8–7.7)
NEUTROPHILS NFR BLD: 85.1 % (ref 38–73)
NITRITE UR QL STRIP: NEGATIVE
PH UR STRIP: 6 [PH] (ref 5–8)
PLATELET # BLD AUTO: 161 K/UL (ref 150–350)
PMV BLD AUTO: 11.1 FL (ref 9.2–12.9)
POTASSIUM SERPL-SCNC: 4.4 MMOL/L (ref 3.5–5.1)
PROT SERPL-MCNC: 6.7 G/DL (ref 6–8.4)
PROT UR QL STRIP: ABNORMAL
RBC # BLD AUTO: 3.76 M/UL (ref 4.6–6.2)
RBC #/AREA URNS AUTO: 1 /HPF (ref 0–4)
SODIUM SERPL-SCNC: 138 MMOL/L (ref 136–145)
SP GR UR STRIP: 1.01 (ref 1–1.03)
SQUAMOUS #/AREA URNS AUTO: 1 /HPF
TROPONIN I SERPL DL<=0.01 NG/ML-MCNC: <0.012 NG/ML (ref 0.01–0.03)
URN SPEC COLLECT METH UR: ABNORMAL
UROBILINOGEN UR STRIP-ACNC: 1 EU/DL
WBC # BLD AUTO: 14.37 K/UL (ref 3.9–12.7)
WBC #/AREA URNS AUTO: 1 /HPF (ref 0–5)

## 2019-07-12 PROCEDURE — 96361 HYDRATE IV INFUSION ADD-ON: CPT | Mod: ER

## 2019-07-12 PROCEDURE — 99284 EMERGENCY DEPT VISIT MOD MDM: CPT | Mod: 25,ER

## 2019-07-12 PROCEDURE — 25000003 PHARM REV CODE 250: Mod: ER | Performed by: FAMILY MEDICINE

## 2019-07-12 PROCEDURE — 81000 URINALYSIS NONAUTO W/SCOPE: CPT | Mod: ER

## 2019-07-12 PROCEDURE — 84484 ASSAY OF TROPONIN QUANT: CPT | Mod: ER

## 2019-07-12 PROCEDURE — 80053 COMPREHEN METABOLIC PANEL: CPT | Mod: ER

## 2019-07-12 PROCEDURE — 82550 ASSAY OF CK (CPK): CPT | Mod: ER

## 2019-07-12 PROCEDURE — 85025 COMPLETE CBC W/AUTO DIFF WBC: CPT | Mod: ER

## 2019-07-12 PROCEDURE — 96360 HYDRATION IV INFUSION INIT: CPT | Mod: ER

## 2019-07-12 RX ORDER — ONDANSETRON 2 MG/ML
4 INJECTION INTRAMUSCULAR; INTRAVENOUS
Status: DISCONTINUED | OUTPATIENT
Start: 2019-07-12 | End: 2019-07-12 | Stop reason: HOSPADM

## 2019-07-12 RX ADMIN — SODIUM CHLORIDE 1000 ML: 0.9 INJECTION, SOLUTION INTRAVENOUS at 02:07

## 2019-07-12 RX ADMIN — SODIUM CHLORIDE 1000 ML: 0.9 INJECTION, SOLUTION INTRAVENOUS at 12:07

## 2019-07-12 NOTE — ED PROVIDER NOTES
Encounter Date: 7/12/2019       History     Chief Complaint   Patient presents with    Fall     73-year-old male presents with chief complaint of fall.  Patient's family reports the patient has been yellow in the last few days although his color appears to be normal again.  They also report that the patient vomited x2.  Patient denies any fever chills.          Review of patient's allergies indicates:   Allergen Reactions    Tetanus vaccines and toxoid      Past Medical History:   Diagnosis Date    Basal cell carcinoma     Diabetes mellitus      Past Surgical History:   Procedure Laterality Date    amputation right great toe      APPLICATION, GRAFT, SKIN, FULL-THICKNESS N/A 12/18/2013    Performed by [facility] at [facility] OR [facility]    AURICULECTOMY Left 11/27/2013    Per[facility] by [facility] at [facility]    RECONSTRUCTION USING FLAP Left 11/27/2013    [facility]    REVISION, PROCEDURE INVOLVING FLAP GRAFT Left 12/18/2013    Performed by [facility] Nguyen MD at [facility] OR [address] [place] [address]     No family history on file.  Social History     Tobacco Use    Smoking status: Never Smoker    Smokeless tobacco: Never Used   Substance Use Topics    Alcohol use: Yes     Alcohol/week: 0.5 oz     Types: 1 Standard drinks or equivalent per week    Drug use: No     Review of Systems   Constitutional: Negative for chills and fever.   Gastrointestinal: Positive for nausea and vomiting. Negative for abdominal pain.   Musculoskeletal: Negative for neck pain.   All other systems reviewed and are negative.      Physical Exam     Initial Vitals [07/12/19 1030]   BP Pulse Resp Temp SpO2   110/60 (!) 112 17 98.1 °F (36.7 °C) 96 %      MAP       --         Physical Exam    Nursing note and vitals reviewed.  Constitutional: He appears well-developed and well-nourished.   HENT:   Head: Normocephalic and atraumatic.   Eyes: EOM are normal. Pupils are equal, round,  and reactive to light.   Neck: Normal range of motion. Neck supple.   Cardiovascular: Normal rate, regular rhythm and normal heart sounds.   Pulmonary/Chest: Breath sounds normal.   Abdominal: Soft. Bowel sounds are normal.   Musculoskeletal: Normal range of motion.   Neurological: He is alert and oriented to person, place, and time. GCS score is 15. GCS eye subscore is 4. GCS verbal subscore is 5. GCS motor subscore is 6.   Skin: Skin is warm. Capillary refill takes less than 2 seconds.   Psychiatric: He has a normal mood and affect. His behavior is normal. Thought content normal.         ED Course   Procedures  Labs Reviewed   CBC W/ AUTO DIFFERENTIAL - Abnormal; Notable for the following components:       Result Value    WBC 14.37 (*)     RBC 3.76 (*)     Hemoglobin 10.9 (*)     Hematocrit 33.1 (*)     Gran # (ANC) 12.2 (*)     Mono # 1.1 (*)     Gran% 85.1 (*)     Lymph% 6.8 (*)     All other components within normal limits   COMPREHENSIVE METABOLIC PANEL   CK   TROPONIN I   URINALYSIS, REFLEX TO URINE CULTURE          Imaging Results          X-Ray Chest 1 View (Final result)  Result time 07/12/19 13:05:21    Final result by Tommy Hutchison MD (07/12/19 13:05:21)                 Impression:      Mild atelectasis or early infiltrate left lung base with trace left pleural effusion.      Electronically signed by: Tommy Hutchison MD  Date:    07/12/2019  Time:    13:05             Narrative:    EXAMINATION:  XR CHEST 1 VIEW    CLINICAL HISTORY:  Elevated white blood cell count, unspecified    FINDINGS:  Single view of the chest.    Cardiac silhouette is normal.  The lungs demonstrate no evidence of consolidation.  Mild atelectasis or early infiltrate left lung base with trace left pleural effusion.  Granuloma right midlung zone.  Aorta demonstrates atherosclerotic disease.  Bones demonstrate mild degenerative changes.  Decreased lung volumes are noted.                                 Medical Decision Making:    Differential Diagnosis:   Orthostatic hypotension, dehydration, myocardial infarction, arrhythmia  Clinical Tests:   Lab Tests: Ordered and Reviewed  The following lab test(s) were unremarkable: CBC, CMP and Troponin  Radiological Study: Ordered and Reviewed  ED Management:  Patient was administered IV fluids with good results.  After receiving 1 L patient's tear did appear to still have some orthostasis although was not symptomatic.  Second L was administered with good results patient was discharged in stable improved condition to follow up with physician as an outpatient.                      Clinical Impression:       ICD-10-CM ICD-9-CM   1. Orthostatic hypotension I95.1 458.0   2. Leukocytosis D72.829 288.60                                Rock Charles MD  07/17/19 2818

## 2019-08-08 ENCOUNTER — HOSPITAL ENCOUNTER (EMERGENCY)
Facility: HOSPITAL | Age: 74
Discharge: HOME | End: 2019-08-08
Attending: EMERGENCY MEDICINE
Payer: MEDICARE

## 2019-08-08 VITALS
BODY MASS INDEX: 26.68 KG/M2 | WEIGHT: 170 LBS | SYSTOLIC BLOOD PRESSURE: 157 MMHG | HEIGHT: 67 IN | DIASTOLIC BLOOD PRESSURE: 81 MMHG | HEART RATE: 100 BPM | OXYGEN SATURATION: 100 % | RESPIRATION RATE: 20 BRPM | TEMPERATURE: 99 F

## 2019-08-08 DIAGNOSIS — K59.01 SLOW TRANSIT CONSTIPATION: Primary | ICD-10-CM

## 2019-08-08 LAB
ALBUMIN SERPL BCP-MCNC: 3.8 G/DL (ref 3.5–5.2)
ALP SERPL-CCNC: 100 U/L (ref 38–126)
ALT SERPL W/O P-5'-P-CCNC: 22 U/L (ref 10–44)
ANION GAP SERPL CALC-SCNC: 7 MMOL/L (ref 8–16)
AST SERPL-CCNC: 21 U/L (ref 15–46)
BASOPHILS # BLD AUTO: 0.05 K/UL (ref 0–0.2)
BASOPHILS NFR BLD: 0.8 % (ref 0–1.9)
BILIRUB SERPL-MCNC: 0.3 MG/DL (ref 0.1–1)
BUN SERPL-MCNC: 33 MG/DL (ref 2–20)
CALCIUM SERPL-MCNC: 9.3 MG/DL (ref 8.7–10.5)
CHLORIDE SERPL-SCNC: 106 MMOL/L (ref 95–110)
CO2 SERPL-SCNC: 26 MMOL/L (ref 23–29)
CREAT SERPL-MCNC: 1.29 MG/DL (ref 0.5–1.4)
DIFFERENTIAL METHOD: ABNORMAL
EOSINOPHIL # BLD AUTO: 0.1 K/UL (ref 0–0.5)
EOSINOPHIL NFR BLD: 1.4 % (ref 0–8)
ERYTHROCYTE [DISTWIDTH] IN BLOOD BY AUTOMATED COUNT: 15 % (ref 11.5–14.5)
EST. GFR  (AFRICAN AMERICAN): >60 ML/MIN/1.73 M^2
EST. GFR  (NON AFRICAN AMERICAN): 54.6 ML/MIN/1.73 M^2
GLUCOSE SERPL-MCNC: 73 MG/DL (ref 70–110)
HCT VFR BLD AUTO: 28.1 % (ref 40–54)
HGB BLD-MCNC: 9.1 G/DL (ref 14–18)
LIPASE SERPL-CCNC: 148 U/L (ref 23–300)
LYMPHOCYTES # BLD AUTO: 1.8 K/UL (ref 1–4.8)
LYMPHOCYTES NFR BLD: 26.7 % (ref 18–48)
MCH RBC QN AUTO: 29.1 PG (ref 27–31)
MCHC RBC AUTO-ENTMCNC: 32.4 G/DL (ref 32–36)
MCV RBC AUTO: 90 FL (ref 82–98)
MONOCYTES # BLD AUTO: 0.7 K/UL (ref 0.3–1)
MONOCYTES NFR BLD: 11.3 % (ref 4–15)
NEUTROPHILS # BLD AUTO: 3.9 K/UL (ref 1.8–7.7)
NEUTROPHILS NFR BLD: 59.8 % (ref 38–73)
PLATELET # BLD AUTO: 364 K/UL (ref 150–350)
PMV BLD AUTO: 9.6 FL (ref 9.2–12.9)
POTASSIUM SERPL-SCNC: 4.5 MMOL/L (ref 3.5–5.1)
PROT SERPL-MCNC: 7.3 G/DL (ref 6–8.4)
RBC # BLD AUTO: 3.13 M/UL (ref 4.6–6.2)
SODIUM SERPL-SCNC: 139 MMOL/L (ref 136–145)
WBC # BLD AUTO: 6.55 K/UL (ref 3.9–12.7)

## 2019-08-08 PROCEDURE — 63600175 PHARM REV CODE 636 W HCPCS: Mod: ER | Performed by: EMERGENCY MEDICINE

## 2019-08-08 PROCEDURE — 80053 COMPREHEN METABOLIC PANEL: CPT | Mod: ER

## 2019-08-08 PROCEDURE — 99284 EMERGENCY DEPT VISIT MOD MDM: CPT | Mod: 25,ER

## 2019-08-08 PROCEDURE — 96360 HYDRATION IV INFUSION INIT: CPT | Mod: ER

## 2019-08-08 PROCEDURE — 83690 ASSAY OF LIPASE: CPT | Mod: ER

## 2019-08-08 PROCEDURE — 85025 COMPLETE CBC W/AUTO DIFF WBC: CPT | Mod: ER

## 2019-08-08 PROCEDURE — 25000003 PHARM REV CODE 250: Mod: ER | Performed by: EMERGENCY MEDICINE

## 2019-08-08 RX ORDER — AMOXICILLIN AND CLAVULANATE POTASSIUM 875; 125 MG/1; MG/1
1 TABLET, FILM COATED ORAL 2 TIMES DAILY
COMMUNITY
End: 2021-08-13 | Stop reason: ALTCHOICE

## 2019-08-08 RX ORDER — INSULIN GLARGINE 100 [IU]/ML
10-20 INJECTION, SOLUTION SUBCUTANEOUS NIGHTLY
COMMUNITY
End: 2022-01-19

## 2019-08-08 RX ORDER — SYRING-NEEDL,DISP,INSUL,0.3 ML 29 G X1/2"
296 SYRINGE, EMPTY DISPOSABLE MISCELLANEOUS
Status: COMPLETED | OUTPATIENT
Start: 2019-08-08 | End: 2019-08-08

## 2019-08-08 RX ORDER — LACTULOSE 10 G/15ML
30 SOLUTION ORAL EVERY 6 HOURS PRN
Qty: 600 ML | Refills: 0 | Status: SHIPPED | OUTPATIENT
Start: 2019-08-08 | End: 2019-08-13

## 2019-08-08 RX ORDER — DOXYCYCLINE HYCLATE 100 MG
100 TABLET ORAL 2 TIMES DAILY
COMMUNITY
End: 2021-08-13

## 2019-08-08 RX ORDER — POLYETHYLENE GLYCOL 3350 17 G/17G
POWDER, FOR SOLUTION ORAL
COMMUNITY
End: 2022-01-19

## 2019-08-08 RX ADMIN — SODIUM CHLORIDE 500 ML: 0.9 INJECTION, SOLUTION INTRAVENOUS at 10:08

## 2019-08-08 RX ADMIN — MAGNESIUM CITRATE 296 ML: 1.75 LIQUID ORAL at 10:08

## 2019-08-09 NOTE — ED TRIAGE NOTES
Pt has been in hospital for several weeks r/t sepsis from toe amputation on left foot. Has been on pain medication and stool softeners but reports no BM for up to a week. pt was disimpacted at hospital and sent home with stool softeners. now currently reporting Nausea and decreased appetite.

## 2019-08-09 NOTE — ED PROVIDER NOTES
Encounter Date: 8/8/2019       History     Chief Complaint   Patient presents with    Constipation     Pt has been in hospital for several weeks r/t sepsis from toe amputation on left foot. Has been on pain medication and stool softeners but reports no BM for up to a week. pt was disimpacted at hospital and sent home with stool softeners. now currently reporting Nausea and decreased appetite.      73-year-old male presents complaining of constipation with no bowel movement for 5 days.  Patient has been having off and on problems with constipation since having his right toe amputated and being placed on pain medications.  Patient admits to nausea but denies fever/chills/vomiting/diarrhea/skin rash/headache.  Patient reports dull generalized abdominal pain that is 3/10.        Review of patient's allergies indicates:   Allergen Reactions    Tetanus vaccines and toxoid      Past Medical History:   Diagnosis Date    Basal cell carcinoma     Diabetes mellitus     Toe amputation status, left      Past Surgical History:   Procedure Laterality Date    amputation right great toe      APPLICATION, GRAFT, SKIN, FULL-THICKNESS N/A 12/18/2013    [facility]formed by [facility]    AURICULECTOMY Left 11/27/2013    Performed by [facility] at [facility]    RECONSTRUCTION USING FLAP Left 11/27/2013    Per[facility] by [facility]    REVISION, PROCEDURE INVOLVING FLAP GRAFT Left 12/18/2013    [facility]     History reviewed. No pertinent family history.  Social History     Tobacco Use    Smoking status: Never Smoker    Smokeless tobacco: Never Used   Substance Use Topics    Alcohol use: Yes     Alcohol/week: 0.5 oz     Types: 1 Standard drinks or equivalent per week    Drug use: No     Review of Systems   Gastrointestinal: Positive for constipation.   All other systems reviewed and are negative.      Physical  Exam     Initial Vitals [08/08/19 2136]   BP Pulse Resp Temp SpO2   (!) 147/84 99 20 98.6 °F (37 °C) 98 %      MAP       --         Physical Exam    Nursing note and vitals reviewed.  Constitutional: He appears well-developed and well-nourished.   HENT:   Head: Normocephalic and atraumatic.   Right Ear: External ear normal.   Left Ear: External ear normal.   Nose: Nose normal.   Mouth/Throat: Oropharynx is clear and moist.   Eyes: Conjunctivae and EOM are normal. Pupils are equal, round, and reactive to light.   Neck: Normal range of motion. Neck supple.   Cardiovascular: Normal rate, regular rhythm, normal heart sounds and intact distal pulses.   Pulmonary/Chest: Breath sounds normal.   Abdominal: Soft. Bowel sounds are normal.   Musculoskeletal: Normal range of motion.   Neurological: He is alert. GCS score is 15. GCS eye subscore is 4. GCS verbal subscore is 5. GCS motor subscore is 6.   Skin: Skin is warm. Capillary refill takes less than 2 seconds.         ED Course   Procedures  Labs Reviewed   CBC W/ AUTO DIFFERENTIAL - Abnormal; Notable for the following components:       Result Value    RBC 3.13 (*)     Hemoglobin 9.1 (*)     Hematocrit 28.1 (*)     RDW 15.0 (*)     Platelets 364 (*)     All other components within normal limits   COMPREHENSIVE METABOLIC PANEL - Abnormal; Notable for the following components:    BUN, Bld 33 (*)     Anion Gap 7 (*)     eGFR if non  54.6 (*)     All other components within normal limits   LIPASE         Results for orders placed or performed during the hospital encounter of 08/08/19   CBC W/ AUTO DIFFERENTIAL   Result Value Ref Range    WBC 6.55 3.90 - 12.70 K/uL    RBC 3.13 (L) 4.60 - 6.20 M/uL    Hemoglobin 9.1 (L) 14.0 - 18.0 g/dL    Hematocrit 28.1 (L) 40.0 - 54.0 %    Mean Corpuscular Volume 90 82 - 98 fL    Mean Corpuscular Hemoglobin 29.1 27.0 - 31.0 pg    Mean Corpuscular Hemoglobin Conc 32.4 32.0 - 36.0 g/dL    RDW 15.0 (H) 11.5 - 14.5 %    Platelets  364 (H) 150 - 350 K/uL    MPV 9.6 9.2 - 12.9 fL    Gran # (ANC) 3.9 1.8 - 7.7 K/uL    Lymph # 1.8 1.0 - 4.8 K/uL    Mono # 0.7 0.3 - 1.0 K/uL    Eos # 0.1 0.0 - 0.5 K/uL    Baso # 0.05 0.00 - 0.20 K/uL    Gran% 59.8 38.0 - 73.0 %    Lymph% 26.7 18.0 - 48.0 %    Mono% 11.3 4.0 - 15.0 %    Eosinophil% 1.4 0.0 - 8.0 %    Basophil% 0.8 0.0 - 1.9 %    Differential Method Automated    Comp. Metabolic Panel   Result Value Ref Range    Sodium 139 136 - 145 mmol/L    Potassium 4.5 3.5 - 5.1 mmol/L    Chloride 106 95 - 110 mmol/L    CO2 26 23 - 29 mmol/L    Glucose 73 70 - 110 mg/dL    BUN, Bld 33 (H) 2 - 20 mg/dL    Creatinine 1.29 0.50 - 1.40 mg/dL    Calcium 9.3 8.7 - 10.5 mg/dL    Total Protein 7.3 6.0 - 8.4 g/dL    Albumin 3.8 3.5 - 5.2 g/dL    Total Bilirubin 0.3 0.1 - 1.0 mg/dL    Alkaline Phosphatase 100 38 - 126 U/L    AST 21 15 - 46 U/L    ALT 22 10 - 44 U/L    Anion Gap 7 (L) 8 - 16 mmol/L    eGFR if African American >60.0 >60 mL/min/1.73 m^2    eGFR if non  54.6 (A) >60 mL/min/1.73 m^2   Lipase   Result Value Ref Range    Lipase Result 148 23 - 300 U/L         Imaging Results          X-Ray Abdomen Flat And Erect (In process)               X-Rays:   Independently Interpreted Readings:   Other Readings:  X-ray flat and upright of abdomen shows constipation, no bowel obstruction                         Clinical Impression:       ICD-10-CM ICD-9-CM   1. Slow transit constipation K59.01 564.01                                Migel Jameson MD  08/09/19 0028

## 2019-10-02 ENCOUNTER — HOSPITAL ENCOUNTER (OUTPATIENT)
Dept: RADIOLOGY | Facility: HOSPITAL | Age: 74
Discharge: HOME | End: 2019-10-02
Attending: SURGERY
Payer: MEDICARE

## 2019-10-02 DIAGNOSIS — R60.9 EDEMA: Primary | ICD-10-CM

## 2019-10-02 DIAGNOSIS — M79.605 LEFT LEG PAIN: ICD-10-CM

## 2019-10-02 DIAGNOSIS — R60.9 EDEMA: ICD-10-CM

## 2019-10-02 DIAGNOSIS — I87.2 CHRONIC VENOUS INSUFFICIENCY: ICD-10-CM

## 2019-10-02 PROCEDURE — 93971 EXTREMITY STUDY: CPT | Mod: TC,PO,LT

## 2020-02-01 ENCOUNTER — HOSPITAL ENCOUNTER (EMERGENCY)
Facility: HOSPITAL | Age: 75
Discharge: HOME | End: 2020-02-01
Attending: EMERGENCY MEDICINE
Payer: MEDICARE

## 2020-02-01 VITALS
HEIGHT: 67 IN | HEART RATE: 99 BPM | WEIGHT: 180 LBS | SYSTOLIC BLOOD PRESSURE: 161 MMHG | BODY MASS INDEX: 28.25 KG/M2 | TEMPERATURE: 98 F | RESPIRATION RATE: 18 BRPM | DIASTOLIC BLOOD PRESSURE: 74 MMHG | OXYGEN SATURATION: 98 %

## 2020-02-01 DIAGNOSIS — H11.001 PTERYGIUM EYE, RIGHT: ICD-10-CM

## 2020-02-01 DIAGNOSIS — H54.61 DECREASED VISION OF RIGHT EYE: Primary | ICD-10-CM

## 2020-02-01 PROCEDURE — 25000003 PHARM REV CODE 250: Mod: ER | Performed by: EMERGENCY MEDICINE

## 2020-02-01 PROCEDURE — 99283 EMERGENCY DEPT VISIT LOW MDM: CPT | Mod: ER

## 2020-02-01 RX ORDER — PROPARACAINE HYDROCHLORIDE 5 MG/ML
1 SOLUTION/ DROPS OPHTHALMIC
Status: COMPLETED | OUTPATIENT
Start: 2020-02-01 | End: 2020-02-01

## 2020-02-01 RX ADMIN — FLUORESCEIN SODIUM 1 EACH: 1 STRIP OPHTHALMIC at 05:02

## 2020-02-01 RX ADMIN — PROPARACAINE HYDROCHLORIDE 1 DROP: 5 SOLUTION/ DROPS OPHTHALMIC at 05:02

## 2020-02-01 NOTE — DISCHARGE INSTRUCTIONS
Call eye doctor for re-evaluation of your decreased vision.  Please return to the ED immediately if symptoms return, change or worsen in any way.  Please call your primary doctor today for reevaluation appointment.

## 2020-02-02 NOTE — ED PROVIDER NOTES
Chief Complaint  Chief Complaint   Patient presents with    Eye Problem     Pt reports a mosquito flew in his right eye last week and he cant see. Denies any pain. Reports vision is blurry and he can see the mosquito in his eye. Pt states a doctor checked his eye since incident and states nothing was found. Pt unsure what doctor saw him.        HPI  To Santos is a 74 y.o. male who presents with a mosquito in his right eye.  Patient reports that last week he had a mosquito flying his eye.  He is adamant that this is what happened.  He recognized inside and felt in still feels that there.  He was hospitalized recently and he has had at least 1 or 2 doctor's evaluate his eye for the same complaint.  It has not changed.  He denies any fever vomiting or diarrhea.  He denies any confusion or seizures.  The denies any significant pain but does feel some mild irritation.  He has decreased vision in the seems to be is biggest trouble.  He is still able to see but not with the same acuity of his other eye.  He denies any sudden changes week or any worsening.    Past medical history  Past Medical History:   Diagnosis Date    Basal cell carcinoma     Diabetes mellitus     Toe amputation status, left        Current Medications  No current facility-administered medications for this encounter.     Current Outpatient Medications:     amoxicillin-clavulanate 875-125mg (AUGMENTIN) 875-125 mg per tablet, Take 1 tablet by mouth 2 (two) times daily., Disp: , Rfl:     aspirin (ECOTRIN) 81 MG EC tablet, Take 1 tablet (81 mg total) by mouth once daily., Disp: 30 tablet, Rfl: 3    atorvastatin (LIPITOR) 40 MG tablet, Take 1 tablet (40 mg total) by mouth once daily., Disp: 90 tablet, Rfl: 3    clopidogrel (PLAVIX) 75 mg tablet, Take 1 tablet (75 mg total) by mouth once daily., Disp: 30 tablet, Rfl: 0    doxycycline (VIBRA-TABS) 100 MG tablet, Take 100 mg by mouth 2 (two) times daily., Disp: , Rfl:     fish oil-omega-3 fatty  acids 300-1,000 mg capsule, Take 2 g by mouth once daily., Disp: , Rfl:     folic acid (FOLVITE) 1 MG tablet, Take 1 tablet (1 mg total) by mouth once daily., Disp: 30 tablet, Rfl: 1    glimepiride (AMARYL) 2 MG tablet, Take 1 tablet (2 mg total) by mouth daily with breakfast., Disp: 90 tablet, Rfl: 3    insulin glargine (LANTUS) 100 unit/mL injection, Inject 10-20 Units into the skin every evening., Disp: , Rfl:     lisinopril (PRINIVIL,ZESTRIL) 5 MG tablet, Take 1 tablet (5 mg total) by mouth once daily., Disp: 90 tablet, Rfl: 3    multivitamin (THERAGRAN) per tablet, Take 1 tablet by mouth once daily., Disp: , Rfl:     polyethylene glycol (GLYCOLAX) 17 gram PwPk, Take by mouth., Disp: , Rfl:     rivaroxaban (XARELTO ORAL), Take by mouth., Disp: , Rfl:     Allergies  Review of patient's allergies indicates:   Allergen Reactions    Tetanus vaccines and toxoid        Surgical history  Past Surgical History:   Procedure Laterality Date    amputation right great toe         Social history  Social History     Socioeconomic History    Marital status:      Spouse name: Not on file    Number of children: Not on file    Years of education: Not on file    Highest education level: Not on file   Occupational History    Not on file   Social Needs    Financial resource strain: Not on file    Food insecurity:     Worry: Not on file     Inability: Not on file    Transportation needs:     Medical: Not on file     Non-medical: Not on file   Tobacco Use    Smoking status: Never Smoker    Smokeless tobacco: Never Used   Substance and Sexual Activity    Alcohol use: Not Currently     Alcohol/week: 0.8 standard drinks     Types: 1 Standard drinks or equivalent per week     Frequency: Never    Drug use: No    Sexual activity: Not on file   Lifestyle    Physical activity:     Days per week: Not on file     Minutes per session: Not on file    Stress: Not on file   Relationships    Social connections:      "Talks on phone: Not on file     Gets together: Not on file     Attends Mosque service: Not on file     Active member of club or organization: Not on file     Attends meetings of clubs or organizations: Not on file     Relationship status: Not on file   Other Topics Concern    Not on file   Social History Narrative    Not on file       Family History  History reviewed. No pertinent family history.    Review of systems  Musculoskeletal: No injury; full range of motion.  Skin: No rash, abscess, or laceration.  Neurologic: No new focal weakness or sensory changes.  All systems otherwise negative except as noted in ROS and HPI    Physical Exam  Vital signs: BP (!) 161/74   Pulse 99   Temp 97.5 °F (36.4 °C) (Oral)   Resp 18   Ht 5' 7" (1.702 m)   Wt 81.6 kg (180 lb)   SpO2 98%   BMI 28.19 kg/m²   Constitutional: No acute distress.  Well developed, alert, oriented and appropriate.  HENT: Normocephalic, atraumatic. Normal ear, nose, and throat.  Eyes: PERRL, EOMI, normal conjunctiva.  There is a pterygium to the medial aspect of the right eye.  No other abnormal changes.  Normal fluorescein exam.  Normal Donny-Pen and intra-ocular pressure.  No retinal detachment  Neck: Normal range of motion, no tenderness; supple.  Respiratory: Nonlabored breathing with normal breath sounds.  Cardiovascular: RRR with no pulse deficit.  GI: Soft, nontender, no rebound or guarding.  Musculoskeletal: Normal ROM, no tenderness, injury, or edema.  Skin: Warm, dry skin without infection or injury.  Neurologic: Normal motor, sensation with no new focal deficit.    Labs  Pertinent labs reviewed (see chart for details)  Labs Reviewed - No data to display    ECG  Results for orders placed or performed during the hospital encounter of 12/01/18   EKG 12-lead    Collection Time: 12/01/18  7:15 PM    Narrative    Test Reason : I63.9,  Blood Pressure : 177/090 mmHG  Vent. Rate : 112 BPM     Atrial Rate : 112 BPM     P-R Int : 212 ms          " QRS Dur : 072 ms      QT Int : 314 ms       P-R-T Axes : 068 015 107 degrees     QTc Int : 428 ms    Sinus tachycardia with 1st degree A-V block  Low septal forces  Nonspecific ST abnormality Now present  Abnormal ECG  When compared with ECG of 01-DEC-2018 16:03,  Confirmed by DAVID OLEARY MD ([id number]) on 12/2/2018 1:05:46 PM    Referred By: LIVAN NEGRON           Confirmed By:DAVID OLEARY MD     ECG interpreted by ED MD    Radiology  No orders to display       Procedures  Procedures    Medications   proparacaine 0.5 % ophthalmic solution 1 drop (1 drop Both Eyes Given 2/1/20 1707)   fluorescein ophthalmic strip 1 each (1 each Both Eyes Given 2/1/20 1707)       ED course and medical decision making    ED Course as of Feb 02 0636   Sat Feb 01, 2020   1721 Patient has normal intraocular pressure.  Patient has decreased vision but still intact.    [MB]      ED Course User Index  [MB] Yariel Aleman MD       Patient does have decreased vision.  He will follow up with the ophthalmologist    Disposition    Patient discharged in stable condition      Final impression  1. Decreased vision of right eye    2. Pterygium eye, right        Critical care time spent with this patient was 0 minutes excluding the procedure time.          Yariel Aleman MD  02/02/20 3714

## 2021-07-05 ENCOUNTER — OUTSIDE PLACE OF SERVICE (OUTPATIENT)
Dept: CARDIOLOGY | Facility: CLINIC | Age: 76
End: 2021-07-05
Payer: MEDICARE

## 2021-07-05 PROCEDURE — 93010 PR ELECTROCARDIOGRAM REPORT: ICD-10-PCS | Mod: ,,, | Performed by: INTERNAL MEDICINE

## 2021-07-05 PROCEDURE — 93010 ELECTROCARDIOGRAM REPORT: CPT | Mod: ,,, | Performed by: INTERNAL MEDICINE

## 2021-08-12 ENCOUNTER — HOSPITAL ENCOUNTER (INPATIENT)
Facility: HOSPITAL | Age: 76
LOS: 1 days | Discharge: HOME HEALTH | DRG: 300 | End: 2021-08-13
Attending: EMERGENCY MEDICINE | Admitting: FAMILY MEDICINE
Payer: MEDICARE

## 2021-08-12 DIAGNOSIS — R00.0 TACHYCARDIA: ICD-10-CM

## 2021-08-12 DIAGNOSIS — M79.89 LEFT LEG SWELLING: ICD-10-CM

## 2021-08-12 DIAGNOSIS — I82.422 ACUTE DEEP VEIN THROMBOSIS (DVT) OF ILIAC VEIN OF LEFT LOWER EXTREMITY: Primary | ICD-10-CM

## 2021-08-12 DIAGNOSIS — R07.9 CHEST PAIN: ICD-10-CM

## 2021-08-12 DIAGNOSIS — N17.9 AKI (ACUTE KIDNEY INJURY): ICD-10-CM

## 2021-08-12 PROCEDURE — 82962 GLUCOSE BLOOD TEST: CPT

## 2021-08-12 PROCEDURE — 96375 TX/PRO/DX INJ NEW DRUG ADDON: CPT

## 2021-08-12 PROCEDURE — 96361 HYDRATE IV INFUSION ADD-ON: CPT

## 2021-08-12 PROCEDURE — 96374 THER/PROPH/DIAG INJ IV PUSH: CPT

## 2021-08-12 PROCEDURE — 99285 EMERGENCY DEPT VISIT HI MDM: CPT | Mod: 25

## 2021-08-12 PROCEDURE — 96372 THER/PROPH/DIAG INJ SC/IM: CPT | Mod: 59

## 2021-08-13 VITALS
OXYGEN SATURATION: 99 % | SYSTOLIC BLOOD PRESSURE: 132 MMHG | BODY MASS INDEX: 28.25 KG/M2 | WEIGHT: 180 LBS | HEIGHT: 67 IN | RESPIRATION RATE: 20 BRPM | TEMPERATURE: 99 F | HEART RATE: 120 BPM | DIASTOLIC BLOOD PRESSURE: 72 MMHG

## 2021-08-13 PROBLEM — I82.422 ACUTE DEEP VEIN THROMBOSIS (DVT) OF ILIAC VEIN OF LEFT LOWER EXTREMITY: Status: ACTIVE | Noted: 2021-08-13

## 2021-08-13 PROBLEM — Z71.89 COUNSELING REGARDING ADVANCE CARE PLANNING AND GOALS OF CARE: Status: ACTIVE | Noted: 2021-08-13

## 2021-08-13 PROBLEM — Z71.89 GOALS OF CARE, COUNSELING/DISCUSSION: Status: ACTIVE | Noted: 2021-08-13

## 2021-08-13 PROBLEM — F03.90 DEMENTIA: Status: ACTIVE | Noted: 2021-08-13

## 2021-08-13 PROBLEM — Z51.5 PALLIATIVE CARE ENCOUNTER: Status: ACTIVE | Noted: 2021-08-13

## 2021-08-13 PROBLEM — N17.9 AKI (ACUTE KIDNEY INJURY): Status: ACTIVE | Noted: 2021-08-13

## 2021-08-13 PROBLEM — L89.159 SACRAL DECUBITUS ULCER: Status: ACTIVE | Noted: 2021-08-13

## 2021-08-13 PROBLEM — E87.5 HYPERKALEMIA: Status: ACTIVE | Noted: 2021-08-13

## 2021-08-13 LAB
ALBUMIN SERPL BCP-MCNC: 3.3 G/DL (ref 3.5–5.2)
ALBUMIN SERPL BCP-MCNC: 3.7 G/DL (ref 3.5–5.2)
ALP SERPL-CCNC: 79 U/L (ref 55–135)
ALP SERPL-CCNC: 85 U/L (ref 55–135)
ALT SERPL W/O P-5'-P-CCNC: 14 U/L (ref 10–44)
ALT SERPL W/O P-5'-P-CCNC: 18 U/L (ref 10–44)
ANION GAP SERPL CALC-SCNC: 10 MMOL/L (ref 8–16)
ANION GAP SERPL CALC-SCNC: 14 MMOL/L (ref 8–16)
AST SERPL-CCNC: 12 U/L (ref 10–40)
AST SERPL-CCNC: 18 U/L (ref 10–40)
BACTERIA #/AREA URNS HPF: NORMAL /HPF
BASOPHILS # BLD AUTO: 0.01 K/UL (ref 0–0.2)
BASOPHILS # BLD AUTO: 0.02 K/UL (ref 0–0.2)
BASOPHILS NFR BLD: 0.1 % (ref 0–1.9)
BASOPHILS NFR BLD: 0.2 % (ref 0–1.9)
BILIRUB SERPL-MCNC: 0.7 MG/DL (ref 0.1–1)
BILIRUB SERPL-MCNC: 0.8 MG/DL (ref 0.1–1)
BILIRUB UR QL STRIP: NEGATIVE
BUN SERPL-MCNC: 46 MG/DL (ref 8–23)
BUN SERPL-MCNC: 55 MG/DL (ref 8–23)
CALCIUM SERPL-MCNC: 8.7 MG/DL (ref 8.7–10.5)
CALCIUM SERPL-MCNC: 9.1 MG/DL (ref 8.7–10.5)
CHLORIDE SERPL-SCNC: 103 MMOL/L (ref 95–110)
CHLORIDE SERPL-SCNC: 106 MMOL/L (ref 95–110)
CLARITY UR: CLEAR
CO2 SERPL-SCNC: 18 MMOL/L (ref 23–29)
CO2 SERPL-SCNC: 18 MMOL/L (ref 23–29)
COLOR UR: YELLOW
CREAT SERPL-MCNC: 1.4 MG/DL (ref 0.5–1.4)
CREAT SERPL-MCNC: 1.8 MG/DL (ref 0.5–1.4)
CTP QC/QA: YES
DIFFERENTIAL METHOD: ABNORMAL
DIFFERENTIAL METHOD: ABNORMAL
EOSINOPHIL # BLD AUTO: 0 K/UL (ref 0–0.5)
EOSINOPHIL # BLD AUTO: 0 K/UL (ref 0–0.5)
EOSINOPHIL NFR BLD: 0 % (ref 0–8)
EOSINOPHIL NFR BLD: 0 % (ref 0–8)
ERYTHROCYTE [DISTWIDTH] IN BLOOD BY AUTOMATED COUNT: 13.2 % (ref 11.5–14.5)
ERYTHROCYTE [DISTWIDTH] IN BLOOD BY AUTOMATED COUNT: 13.2 % (ref 11.5–14.5)
EST. GFR  (AFRICAN AMERICAN): 42 ML/MIN/1.73 M^2
EST. GFR  (AFRICAN AMERICAN): 56 ML/MIN/1.73 M^2
EST. GFR  (NON AFRICAN AMERICAN): 36 ML/MIN/1.73 M^2
EST. GFR  (NON AFRICAN AMERICAN): 49 ML/MIN/1.73 M^2
GLUCOSE SERPL-MCNC: 164 MG/DL (ref 70–110)
GLUCOSE SERPL-MCNC: 170 MG/DL (ref 70–110)
GLUCOSE UR QL STRIP: ABNORMAL
HCT VFR BLD AUTO: 36.4 % (ref 40–54)
HCT VFR BLD AUTO: 36.9 % (ref 40–54)
HGB BLD-MCNC: 12 G/DL (ref 14–18)
HGB BLD-MCNC: 12.4 G/DL (ref 14–18)
HGB UR QL STRIP: ABNORMAL
HYALINE CASTS #/AREA URNS LPF: 0 /LPF
IMM GRANULOCYTES # BLD AUTO: 0.03 K/UL (ref 0–0.04)
IMM GRANULOCYTES # BLD AUTO: 0.05 K/UL (ref 0–0.04)
IMM GRANULOCYTES NFR BLD AUTO: 0.3 % (ref 0–0.5)
IMM GRANULOCYTES NFR BLD AUTO: 0.4 % (ref 0–0.5)
KETONES UR QL STRIP: NEGATIVE
LEUKOCYTE ESTERASE UR QL STRIP: NEGATIVE
LYMPHOCYTES # BLD AUTO: 0.6 K/UL (ref 1–4.8)
LYMPHOCYTES # BLD AUTO: 0.6 K/UL (ref 1–4.8)
LYMPHOCYTES NFR BLD: 4.9 % (ref 18–48)
LYMPHOCYTES NFR BLD: 5.6 % (ref 18–48)
MAGNESIUM SERPL-MCNC: 2.5 MG/DL (ref 1.6–2.6)
MCH RBC QN AUTO: 29.6 PG (ref 27–31)
MCH RBC QN AUTO: 30.3 PG (ref 27–31)
MCHC RBC AUTO-ENTMCNC: 32.5 G/DL (ref 32–36)
MCHC RBC AUTO-ENTMCNC: 34.1 G/DL (ref 32–36)
MCV RBC AUTO: 89 FL (ref 82–98)
MCV RBC AUTO: 91 FL (ref 82–98)
MICROSCOPIC COMMENT: NORMAL
MONOCYTES # BLD AUTO: 1.1 K/UL (ref 0.3–1)
MONOCYTES # BLD AUTO: 1.2 K/UL (ref 0.3–1)
MONOCYTES NFR BLD: 10.1 % (ref 4–15)
MONOCYTES NFR BLD: 9.5 % (ref 4–15)
NEUTROPHILS # BLD AUTO: 10.5 K/UL (ref 1.8–7.7)
NEUTROPHILS # BLD AUTO: 9.3 K/UL (ref 1.8–7.7)
NEUTROPHILS NFR BLD: 83.8 % (ref 38–73)
NEUTROPHILS NFR BLD: 85.1 % (ref 38–73)
NITRITE UR QL STRIP: NEGATIVE
NRBC BLD-RTO: 0 /100 WBC
NRBC BLD-RTO: 0 /100 WBC
PH UR STRIP: 5 [PH] (ref 5–8)
PHOSPHATE SERPL-MCNC: 2.8 MG/DL (ref 2.7–4.5)
PLATELET # BLD AUTO: 132 K/UL (ref 150–450)
PLATELET # BLD AUTO: 145 K/UL (ref 150–450)
PMV BLD AUTO: 10.9 FL (ref 9.2–12.9)
PMV BLD AUTO: 11.3 FL (ref 9.2–12.9)
POCT GLUCOSE: 147 MG/DL (ref 70–110)
POCT GLUCOSE: 156 MG/DL (ref 70–110)
POCT GLUCOSE: 157 MG/DL (ref 70–110)
POCT GLUCOSE: 162 MG/DL (ref 70–110)
POCT GLUCOSE: 173 MG/DL (ref 70–110)
POTASSIUM SERPL-SCNC: 4.9 MMOL/L (ref 3.5–5.1)
POTASSIUM SERPL-SCNC: 5.5 MMOL/L (ref 3.5–5.1)
PROT SERPL-MCNC: 6.2 G/DL (ref 6–8.4)
PROT SERPL-MCNC: 6.9 G/DL (ref 6–8.4)
PROT UR QL STRIP: ABNORMAL
RBC # BLD AUTO: 4.06 M/UL (ref 4.6–6.2)
RBC # BLD AUTO: 4.09 M/UL (ref 4.6–6.2)
RBC #/AREA URNS HPF: 3 /HPF (ref 0–4)
SARS-COV-2 RDRP RESP QL NAA+PROBE: NEGATIVE
SODIUM SERPL-SCNC: 134 MMOL/L (ref 136–145)
SODIUM SERPL-SCNC: 135 MMOL/L (ref 136–145)
SP GR UR STRIP: >1.03 (ref 1–1.03)
TROPONIN I SERPL DL<=0.01 NG/ML-MCNC: 0.03 NG/ML (ref 0–0.03)
URN SPEC COLLECT METH UR: ABNORMAL
UROBILINOGEN UR STRIP-ACNC: NEGATIVE EU/DL
WBC # BLD AUTO: 11.06 K/UL (ref 3.9–12.7)
WBC # BLD AUTO: 12.34 K/UL (ref 3.9–12.7)
WBC #/AREA URNS HPF: 1 /HPF (ref 0–5)

## 2021-08-13 PROCEDURE — 99223 1ST HOSP IP/OBS HIGH 75: CPT | Mod: ,,, | Performed by: NURSE PRACTITIONER

## 2021-08-13 PROCEDURE — 81000 URINALYSIS NONAUTO W/SCOPE: CPT | Performed by: STUDENT IN AN ORGANIZED HEALTH CARE EDUCATION/TRAINING PROGRAM

## 2021-08-13 PROCEDURE — 99291 PR CRITICAL CARE, E/M 30-74 MINUTES: ICD-10-PCS | Mod: ,,, | Performed by: INTERNAL MEDICINE

## 2021-08-13 PROCEDURE — 93010 EKG 12-LEAD: ICD-10-PCS | Mod: ,,, | Performed by: INTERNAL MEDICINE

## 2021-08-13 PROCEDURE — 80053 COMPREHEN METABOLIC PANEL: CPT | Mod: 91 | Performed by: NURSE PRACTITIONER

## 2021-08-13 PROCEDURE — 99223 PR INITIAL HOSPITAL CARE,LEVL III: ICD-10-PCS | Mod: ,,, | Performed by: NURSE PRACTITIONER

## 2021-08-13 PROCEDURE — U0002 COVID-19 LAB TEST NON-CDC: HCPCS | Performed by: STUDENT IN AN ORGANIZED HEALTH CARE EDUCATION/TRAINING PROGRAM

## 2021-08-13 PROCEDURE — 25000003 PHARM REV CODE 250: Performed by: STUDENT IN AN ORGANIZED HEALTH CARE EDUCATION/TRAINING PROGRAM

## 2021-08-13 PROCEDURE — 93010 ELECTROCARDIOGRAM REPORT: CPT | Mod: ,,, | Performed by: INTERNAL MEDICINE

## 2021-08-13 PROCEDURE — 99497 ADVNCD CARE PLAN 30 MIN: CPT | Mod: 25,,, | Performed by: NURSE PRACTITIONER

## 2021-08-13 PROCEDURE — 99497 PR ADVNCD CARE PLAN 30 MIN: ICD-10-PCS | Mod: 25,,, | Performed by: NURSE PRACTITIONER

## 2021-08-13 PROCEDURE — 12000002 HC ACUTE/MED SURGE SEMI-PRIVATE ROOM

## 2021-08-13 PROCEDURE — 63600175 PHARM REV CODE 636 W HCPCS: Performed by: STUDENT IN AN ORGANIZED HEALTH CARE EDUCATION/TRAINING PROGRAM

## 2021-08-13 PROCEDURE — 83735 ASSAY OF MAGNESIUM: CPT | Performed by: NURSE PRACTITIONER

## 2021-08-13 PROCEDURE — 25000003 PHARM REV CODE 250: Performed by: EMERGENCY MEDICINE

## 2021-08-13 PROCEDURE — 80053 COMPREHEN METABOLIC PANEL: CPT | Performed by: STUDENT IN AN ORGANIZED HEALTH CARE EDUCATION/TRAINING PROGRAM

## 2021-08-13 PROCEDURE — 93005 ELECTROCARDIOGRAM TRACING: CPT

## 2021-08-13 PROCEDURE — 84484 ASSAY OF TROPONIN QUANT: CPT | Performed by: STUDENT IN AN ORGANIZED HEALTH CARE EDUCATION/TRAINING PROGRAM

## 2021-08-13 PROCEDURE — 85025 COMPLETE CBC W/AUTO DIFF WBC: CPT | Performed by: EMERGENCY MEDICINE

## 2021-08-13 PROCEDURE — 25000003 PHARM REV CODE 250: Performed by: FAMILY MEDICINE

## 2021-08-13 PROCEDURE — 99291 CRITICAL CARE FIRST HOUR: CPT | Mod: ,,, | Performed by: INTERNAL MEDICINE

## 2021-08-13 PROCEDURE — 25000003 PHARM REV CODE 250: Performed by: NURSE PRACTITIONER

## 2021-08-13 PROCEDURE — 84100 ASSAY OF PHOSPHORUS: CPT | Performed by: NURSE PRACTITIONER

## 2021-08-13 PROCEDURE — A4216 STERILE WATER/SALINE, 10 ML: HCPCS | Performed by: NURSE PRACTITIONER

## 2021-08-13 PROCEDURE — 25500020 PHARM REV CODE 255: Performed by: EMERGENCY MEDICINE

## 2021-08-13 PROCEDURE — 85025 COMPLETE CBC W/AUTO DIFF WBC: CPT | Mod: 91 | Performed by: NURSE PRACTITIONER

## 2021-08-13 RX ORDER — ENOXAPARIN SODIUM 100 MG/ML
1 INJECTION SUBCUTANEOUS EVERY 24 HOURS
Status: DISCONTINUED | OUTPATIENT
Start: 2021-08-13 | End: 2021-08-13

## 2021-08-13 RX ORDER — IBUPROFEN 200 MG
24 TABLET ORAL
Status: DISCONTINUED | OUTPATIENT
Start: 2021-08-13 | End: 2021-08-13 | Stop reason: HOSPADM

## 2021-08-13 RX ORDER — SODIUM,POTASSIUM PHOSPHATES 280-250MG
2 POWDER IN PACKET (EA) ORAL
Status: DISCONTINUED | OUTPATIENT
Start: 2021-08-13 | End: 2021-08-13

## 2021-08-13 RX ORDER — MEMANTINE HYDROCHLORIDE 5 MG/1
10 TABLET ORAL DAILY
Status: DISCONTINUED | OUTPATIENT
Start: 2021-08-13 | End: 2021-08-13 | Stop reason: HOSPADM

## 2021-08-13 RX ORDER — AMLODIPINE BESYLATE 10 MG/1
10 TABLET ORAL
COMMUNITY
Start: 2019-08-05 | End: 2022-01-19

## 2021-08-13 RX ORDER — DONEPEZIL HYDROCHLORIDE 5 MG/1
5 TABLET, FILM COATED ORAL
COMMUNITY
Start: 2020-07-08 | End: 2022-01-19

## 2021-08-13 RX ORDER — ENOXAPARIN SODIUM 100 MG/ML
1 INJECTION SUBCUTANEOUS
Status: DISCONTINUED | OUTPATIENT
Start: 2021-08-14 | End: 2021-08-13

## 2021-08-13 RX ORDER — ACETAMINOPHEN 325 MG/1
650 TABLET ORAL EVERY 8 HOURS PRN
Status: DISCONTINUED | OUTPATIENT
Start: 2021-08-13 | End: 2021-08-13 | Stop reason: HOSPADM

## 2021-08-13 RX ORDER — ONDANSETRON 2 MG/ML
4 INJECTION INTRAMUSCULAR; INTRAVENOUS EVERY 8 HOURS PRN
Status: DISCONTINUED | OUTPATIENT
Start: 2021-08-13 | End: 2021-08-13 | Stop reason: HOSPADM

## 2021-08-13 RX ORDER — GLIMEPIRIDE 2 MG/1
1 TABLET ORAL 2 TIMES DAILY
COMMUNITY
End: 2021-08-13

## 2021-08-13 RX ORDER — IBUPROFEN 200 MG
16 TABLET ORAL
Status: DISCONTINUED | OUTPATIENT
Start: 2021-08-13 | End: 2021-08-13 | Stop reason: HOSPADM

## 2021-08-13 RX ORDER — GLUCAGON 1 MG
1 KIT INJECTION
Status: DISCONTINUED | OUTPATIENT
Start: 2021-08-13 | End: 2021-08-13 | Stop reason: HOSPADM

## 2021-08-13 RX ORDER — DIPHENOXYLATE HYDROCHLORIDE AND ATROPINE SULFATE 2.5; .025 MG/1; MG/1
1 TABLET ORAL 4 TIMES DAILY
COMMUNITY
Start: 2021-06-15 | End: 2022-01-19

## 2021-08-13 RX ORDER — AMLODIPINE BESYLATE 5 MG/1
10 TABLET ORAL DAILY
Status: DISCONTINUED | OUTPATIENT
Start: 2021-08-13 | End: 2021-08-13 | Stop reason: HOSPADM

## 2021-08-13 RX ORDER — INSULIN ASPART 100 [IU]/ML
0-5 INJECTION, SOLUTION INTRAVENOUS; SUBCUTANEOUS
Status: DISCONTINUED | OUTPATIENT
Start: 2021-08-13 | End: 2021-08-13 | Stop reason: HOSPADM

## 2021-08-13 RX ORDER — SODIUM CITRATE AND CITRIC ACID MONOHYDRATE 334; 500 MG/5ML; MG/5ML
15 SOLUTION ORAL 3 TIMES DAILY
Status: DISCONTINUED | OUTPATIENT
Start: 2021-08-13 | End: 2021-08-13 | Stop reason: HOSPADM

## 2021-08-13 RX ORDER — QUETIAPINE FUMARATE 25 MG/1
25-50 TABLET, FILM COATED ORAL NIGHTLY
COMMUNITY
End: 2021-08-13

## 2021-08-13 RX ORDER — POLYETHYLENE GLYCOL 3350 17 G/17G
17 POWDER, FOR SOLUTION ORAL DAILY PRN
Status: DISCONTINUED | OUTPATIENT
Start: 2021-08-13 | End: 2021-08-13 | Stop reason: HOSPADM

## 2021-08-13 RX ORDER — TALC
6 POWDER (GRAM) TOPICAL NIGHTLY PRN
Status: DISCONTINUED | OUTPATIENT
Start: 2021-08-13 | End: 2021-08-13 | Stop reason: HOSPADM

## 2021-08-13 RX ORDER — TAMSULOSIN HYDROCHLORIDE 0.4 MG/1
0.4 CAPSULE ORAL
COMMUNITY
Start: 2019-08-05 | End: 2022-01-19

## 2021-08-13 RX ORDER — CLONAZEPAM 0.5 MG/1
0.5 TABLET ORAL DAILY
Status: DISCONTINUED | OUTPATIENT
Start: 2021-08-13 | End: 2021-08-13 | Stop reason: HOSPADM

## 2021-08-13 RX ORDER — ATORVASTATIN CALCIUM 40 MG/1
40 TABLET, FILM COATED ORAL DAILY
Status: DISCONTINUED | OUTPATIENT
Start: 2021-08-13 | End: 2021-08-13 | Stop reason: HOSPADM

## 2021-08-13 RX ORDER — CLONAZEPAM 0.5 MG/1
0.5 TABLET ORAL DAILY
COMMUNITY
Start: 2021-07-16 | End: 2022-01-19

## 2021-08-13 RX ORDER — LANOLIN ALCOHOL/MO/W.PET/CERES
800 CREAM (GRAM) TOPICAL
Status: DISCONTINUED | OUTPATIENT
Start: 2021-08-13 | End: 2021-08-13 | Stop reason: HOSPADM

## 2021-08-13 RX ORDER — DONEPEZIL HYDROCHLORIDE 5 MG/1
5 TABLET, FILM COATED ORAL DAILY
Status: DISCONTINUED | OUTPATIENT
Start: 2021-08-13 | End: 2021-08-13 | Stop reason: HOSPADM

## 2021-08-13 RX ORDER — ENOXAPARIN SODIUM 100 MG/ML
1 INJECTION SUBCUTANEOUS
Status: COMPLETED | OUTPATIENT
Start: 2021-08-13 | End: 2021-08-13

## 2021-08-13 RX ORDER — ATORVASTATIN CALCIUM 20 MG/1
20 TABLET, FILM COATED ORAL DAILY
COMMUNITY
End: 2022-01-19

## 2021-08-13 RX ORDER — METOPROLOL TARTRATE 25 MG/1
25 TABLET, FILM COATED ORAL 2 TIMES DAILY
Status: DISCONTINUED | OUTPATIENT
Start: 2021-08-13 | End: 2021-08-13 | Stop reason: HOSPADM

## 2021-08-13 RX ORDER — GLIMEPIRIDE 2 MG/1
2 TABLET ORAL
COMMUNITY
End: 2022-01-19

## 2021-08-13 RX ORDER — DEXTROSE 50 % IN WATER (D50W) INTRAVENOUS SYRINGE
25
Status: COMPLETED | OUTPATIENT
Start: 2021-08-13 | End: 2021-08-13

## 2021-08-13 RX ORDER — VALSARTAN 80 MG/1
80 TABLET ORAL DAILY
COMMUNITY
Start: 2021-07-30 | End: 2022-01-19

## 2021-08-13 RX ORDER — MEMANTINE HYDROCHLORIDE 10 MG/1
10 TABLET ORAL
COMMUNITY
Start: 2020-06-25 | End: 2022-01-19

## 2021-08-13 RX ORDER — BUMETANIDE 2 MG/1
2 TABLET ORAL DAILY
Qty: 30 TABLET | Refills: 2 | Status: SHIPPED | OUTPATIENT
Start: 2021-08-15 | End: 2022-01-19

## 2021-08-13 RX ORDER — QUETIAPINE FUMARATE 25 MG/1
25 TABLET, FILM COATED ORAL EVERY MORNING
COMMUNITY
Start: 2021-07-22

## 2021-08-13 RX ORDER — ONDANSETRON 4 MG/1
4 TABLET, ORALLY DISINTEGRATING ORAL EVERY 8 HOURS PRN
COMMUNITY
Start: 2021-07-06 | End: 2022-01-19

## 2021-08-13 RX ORDER — IPRATROPIUM BROMIDE AND ALBUTEROL SULFATE 2.5; .5 MG/3ML; MG/3ML
3 SOLUTION RESPIRATORY (INHALATION) EVERY 4 HOURS PRN
Status: DISCONTINUED | OUTPATIENT
Start: 2021-08-13 | End: 2021-08-13 | Stop reason: HOSPADM

## 2021-08-13 RX ORDER — IPRATROPIUM BROMIDE AND ALBUTEROL SULFATE 2.5; .5 MG/3ML; MG/3ML
3 SOLUTION RESPIRATORY (INHALATION) EVERY 4 HOURS PRN
Qty: 90 ML | Refills: 0 | Status: SHIPPED | OUTPATIENT
Start: 2021-08-13 | End: 2022-01-19

## 2021-08-13 RX ORDER — BUMETANIDE 2 MG/1
2 TABLET ORAL
COMMUNITY
Start: 2020-01-28 | End: 2021-08-13 | Stop reason: SDUPTHER

## 2021-08-13 RX ORDER — TRAMADOL HYDROCHLORIDE 50 MG/1
50 TABLET ORAL 3 TIMES DAILY PRN
COMMUNITY
Start: 2021-05-04 | End: 2022-01-19

## 2021-08-13 RX ORDER — SODIUM CHLORIDE 0.9 % (FLUSH) 0.9 %
10 SYRINGE (ML) INJECTION EVERY 8 HOURS
Status: DISCONTINUED | OUTPATIENT
Start: 2021-08-13 | End: 2021-08-13 | Stop reason: HOSPADM

## 2021-08-13 RX ORDER — METOPROLOL TARTRATE 1 MG/ML
5 INJECTION, SOLUTION INTRAVENOUS ONCE
Status: DISCONTINUED | OUTPATIENT
Start: 2021-08-13 | End: 2021-08-13

## 2021-08-13 RX ORDER — HALOPERIDOL 5 MG/ML
2 INJECTION INTRAMUSCULAR
Status: COMPLETED | OUTPATIENT
Start: 2021-08-13 | End: 2021-08-13

## 2021-08-13 RX ADMIN — IOHEXOL 100 ML: 350 INJECTION, SOLUTION INTRAVENOUS at 03:08

## 2021-08-13 RX ADMIN — INSULIN HUMAN 6 UNITS: 100 INJECTION, SOLUTION PARENTERAL at 02:08

## 2021-08-13 RX ADMIN — CLONAZEPAM 0.5 MG: 0.5 TABLET ORAL at 08:08

## 2021-08-13 RX ADMIN — ENOXAPARIN SODIUM 80 MG: 40 INJECTION SUBCUTANEOUS at 02:08

## 2021-08-13 RX ADMIN — ATORVASTATIN CALCIUM 40 MG: 40 TABLET, FILM COATED ORAL at 08:08

## 2021-08-13 RX ADMIN — SODIUM CHLORIDE 10 ML: 9 INJECTION, SOLUTION INTRAMUSCULAR; INTRAVENOUS; SUBCUTANEOUS at 02:08

## 2021-08-13 RX ADMIN — HALOPERIDOL LACTATE 2 MG: 5 INJECTION, SOLUTION INTRAMUSCULAR at 02:08

## 2021-08-13 RX ADMIN — SODIUM CHLORIDE 10 ML: 9 INJECTION, SOLUTION INTRAMUSCULAR; INTRAVENOUS; SUBCUTANEOUS at 06:08

## 2021-08-13 RX ADMIN — DEXTROSE MONOHYDRATE 25 G: 25 INJECTION, SOLUTION INTRAVENOUS at 02:08

## 2021-08-13 RX ADMIN — APIXABAN 10 MG: 5 TABLET, FILM COATED ORAL at 04:08

## 2021-08-13 RX ADMIN — DONEPEZIL HYDROCHLORIDE 5 MG: 5 TABLET, FILM COATED ORAL at 08:08

## 2021-08-13 RX ADMIN — SODIUM CHLORIDE 1000 ML: 0.9 INJECTION, SOLUTION INTRAVENOUS at 01:08

## 2021-08-13 RX ADMIN — MEMANTINE HYDROCHLORIDE 10 MG: 5 TABLET ORAL at 08:08

## 2021-08-13 RX ADMIN — AMLODIPINE BESYLATE 10 MG: 5 TABLET ORAL at 08:08

## 2021-08-15 ENCOUNTER — NURSE TRIAGE (OUTPATIENT)
Dept: ADMINISTRATIVE | Facility: CLINIC | Age: 76
End: 2021-08-15

## 2021-08-17 ENCOUNTER — TELEPHONE (OUTPATIENT)
Dept: CARDIOLOGY | Facility: CLINIC | Age: 76
End: 2021-08-17

## 2021-08-18 ENCOUNTER — HOSPITAL ENCOUNTER (EMERGENCY)
Facility: HOSPITAL | Age: 76
Discharge: HOME | End: 2021-08-18
Attending: EMERGENCY MEDICINE
Payer: MEDICARE

## 2021-08-18 VITALS
HEIGHT: 67 IN | OXYGEN SATURATION: 97 % | SYSTOLIC BLOOD PRESSURE: 120 MMHG | HEART RATE: 97 BPM | RESPIRATION RATE: 17 BRPM | WEIGHT: 180 LBS | TEMPERATURE: 99 F | BODY MASS INDEX: 28.25 KG/M2 | DIASTOLIC BLOOD PRESSURE: 69 MMHG

## 2021-08-18 DIAGNOSIS — R63.0 DECREASED APPETITE: Primary | ICD-10-CM

## 2021-08-18 PROCEDURE — 99283 EMERGENCY DEPT VISIT LOW MDM: CPT | Mod: ER

## 2021-08-25 ENCOUNTER — TELEPHONE (OUTPATIENT)
Dept: CARDIOLOGY | Facility: CLINIC | Age: 76
End: 2021-08-25

## 2021-08-27 ENCOUNTER — TELEPHONE (OUTPATIENT)
Dept: CARDIOLOGY | Facility: CLINIC | Age: 76
End: 2021-08-27

## 2021-09-22 ENCOUNTER — TELEPHONE (OUTPATIENT)
Dept: CARDIOLOGY | Facility: CLINIC | Age: 76
End: 2021-09-22

## 2021-09-22 ENCOUNTER — PATIENT MESSAGE (OUTPATIENT)
Dept: CARDIOLOGY | Facility: CLINIC | Age: 76
End: 2021-09-22

## 2021-09-23 ENCOUNTER — TELEPHONE (OUTPATIENT)
Dept: CARDIOLOGY | Facility: CLINIC | Age: 76
End: 2021-09-23

## 2021-11-21 ENCOUNTER — HOSPITAL ENCOUNTER (EMERGENCY)
Facility: HOSPITAL | Age: 76
Discharge: HOME | End: 2021-11-21
Attending: FAMILY MEDICINE
Payer: MEDICARE

## 2021-11-21 VITALS
HEART RATE: 93 BPM | TEMPERATURE: 99 F | DIASTOLIC BLOOD PRESSURE: 76 MMHG | RESPIRATION RATE: 16 BRPM | OXYGEN SATURATION: 98 % | SYSTOLIC BLOOD PRESSURE: 128 MMHG

## 2021-11-21 DIAGNOSIS — G45.9 TIA (TRANSIENT ISCHEMIC ATTACK): ICD-10-CM

## 2021-11-21 DIAGNOSIS — R41.82 AMS (ALTERED MENTAL STATUS): ICD-10-CM

## 2021-11-21 DIAGNOSIS — Z74.01 BEDRIDDEN: ICD-10-CM

## 2021-11-21 DIAGNOSIS — I65.03 STENOSIS OF BOTH VERTEBRAL ARTERIES: Primary | ICD-10-CM

## 2021-11-21 LAB
ALBUMIN SERPL BCP-MCNC: 3.7 G/DL (ref 3.5–5.2)
ALP SERPL-CCNC: 57 U/L (ref 38–126)
ALT SERPL W/O P-5'-P-CCNC: 67 U/L (ref 10–44)
ANION GAP SERPL CALC-SCNC: 10 MMOL/L (ref 8–16)
AST SERPL-CCNC: 37 U/L (ref 15–46)
BACTERIA #/AREA URNS AUTO: ABNORMAL /HPF
BASOPHILS # BLD AUTO: 0.04 K/UL (ref 0–0.2)
BASOPHILS NFR BLD: 0.7 % (ref 0–1.9)
BILIRUB SERPL-MCNC: 0.5 MG/DL (ref 0.1–1)
BILIRUB UR QL STRIP: NEGATIVE
CALCIUM SERPL-MCNC: 8.7 MG/DL (ref 8.7–10.5)
CHLORIDE SERPL-SCNC: 104 MMOL/L (ref 95–110)
CLARITY UR REFRACT.AUTO: CLEAR
CO2 SERPL-SCNC: 25 MMOL/L (ref 23–29)
COLOR UR AUTO: YELLOW
CREAT SERPL-MCNC: 1.09 MG/DL (ref 0.5–1.4)
DIFFERENTIAL METHOD: ABNORMAL
EOSINOPHIL # BLD AUTO: 0.1 K/UL (ref 0–0.5)
EOSINOPHIL NFR BLD: 2.5 % (ref 0–8)
ERYTHROCYTE [DISTWIDTH] IN BLOOD BY AUTOMATED COUNT: 13.5 % (ref 11.5–14.5)
EST. GFR  (AFRICAN AMERICAN): >60 ML/MIN/1.73 M^2
EST. GFR  (NON AFRICAN AMERICAN): >60 ML/MIN/1.73 M^2
GLUCOSE SERPL-MCNC: 180 MG/DL (ref 70–110)
GLUCOSE UR QL STRIP: NEGATIVE
HCT VFR BLD AUTO: 38.9 % (ref 40–54)
HGB BLD-MCNC: 13.2 G/DL (ref 14–18)
HGB UR QL STRIP: ABNORMAL
HYALINE CASTS UR QL AUTO: 0 /LPF
IMM GRANULOCYTES # BLD AUTO: 0.01 K/UL (ref 0–0.04)
IMM GRANULOCYTES NFR BLD AUTO: 0.2 % (ref 0–0.5)
KETONES UR QL STRIP: NEGATIVE
LEUKOCYTE ESTERASE UR QL STRIP: ABNORMAL
LYMPHOCYTES # BLD AUTO: 1.1 K/UL (ref 1–4.8)
LYMPHOCYTES NFR BLD: 19.6 % (ref 18–48)
MCH RBC QN AUTO: 30.2 PG (ref 27–31)
MCHC RBC AUTO-ENTMCNC: 33.9 G/DL (ref 32–36)
MCV RBC AUTO: 89 FL (ref 82–98)
MICROSCOPIC COMMENT: ABNORMAL
MONOCYTES # BLD AUTO: 0.5 K/UL (ref 0.3–1)
MONOCYTES NFR BLD: 9.6 % (ref 4–15)
NEUTROPHILS # BLD AUTO: 3.8 K/UL (ref 1.8–7.7)
NEUTROPHILS NFR BLD: 67.4 % (ref 38–73)
NITRITE UR QL STRIP: NEGATIVE
NRBC BLD-RTO: 0 /100 WBC
NT-PROBNP SERPL-MCNC: 361 PG/ML (ref 5–900)
PH UR STRIP: 5 [PH] (ref 5–8)
PLATELET # BLD AUTO: 143 K/UL (ref 150–450)
PMV BLD AUTO: 11.4 FL (ref 9.2–12.9)
POCT GLUCOSE: 197 MG/DL (ref 70–110)
POTASSIUM SERPL-SCNC: 4.8 MMOL/L (ref 3.5–5.1)
PROT SERPL-MCNC: 6.1 G/DL (ref 6–8.4)
PROT UR QL STRIP: ABNORMAL
RBC # BLD AUTO: 4.37 M/UL (ref 4.6–6.2)
RBC #/AREA URNS AUTO: 2 /HPF (ref 0–4)
SODIUM SERPL-SCNC: 139 MMOL/L (ref 136–145)
SP GR UR STRIP: 1.01 (ref 1–1.03)
TROPONIN I SERPL-MCNC: <0.012 NG/ML (ref 0.01–0.03)
URN SPEC COLLECT METH UR: ABNORMAL
UROBILINOGEN UR STRIP-ACNC: NEGATIVE EU/DL
UUN UR-MCNC: 30 MG/DL (ref 2–20)
WBC # BLD AUTO: 5.65 K/UL (ref 3.9–12.7)
WBC #/AREA URNS AUTO: 4 /HPF (ref 0–5)

## 2021-11-21 PROCEDURE — 93010 ELECTROCARDIOGRAM REPORT: CPT | Mod: ,,, | Performed by: INTERNAL MEDICINE

## 2021-11-21 PROCEDURE — 93010 EKG 12-LEAD: ICD-10-PCS | Mod: ,,, | Performed by: INTERNAL MEDICINE

## 2021-11-21 PROCEDURE — 81000 URINALYSIS NONAUTO W/SCOPE: CPT | Mod: ER | Performed by: FAMILY MEDICINE

## 2021-11-21 PROCEDURE — 82962 GLUCOSE BLOOD TEST: CPT | Mod: ER

## 2021-11-21 PROCEDURE — 93005 ELECTROCARDIOGRAM TRACING: CPT | Mod: ER

## 2021-11-21 PROCEDURE — 99285 EMERGENCY DEPT VISIT HI MDM: CPT | Mod: 25,ER

## 2021-11-21 PROCEDURE — 25500020 PHARM REV CODE 255: Mod: ER | Performed by: FAMILY MEDICINE

## 2021-11-21 PROCEDURE — 84484 ASSAY OF TROPONIN QUANT: CPT | Mod: ER | Performed by: FAMILY MEDICINE

## 2021-11-21 PROCEDURE — 99291 CRITICAL CARE FIRST HOUR: CPT | Mod: 25,ER

## 2021-11-21 PROCEDURE — 83880 ASSAY OF NATRIURETIC PEPTIDE: CPT | Mod: ER | Performed by: FAMILY MEDICINE

## 2021-11-21 PROCEDURE — 80053 COMPREHEN METABOLIC PANEL: CPT | Mod: ER | Performed by: FAMILY MEDICINE

## 2021-11-21 PROCEDURE — 85025 COMPLETE CBC W/AUTO DIFF WBC: CPT | Mod: ER | Performed by: FAMILY MEDICINE

## 2021-11-21 RX ADMIN — IOHEXOL 100 ML: 350 INJECTION, SOLUTION INTRAVENOUS at 03:11

## 2022-01-18 ENCOUNTER — TELEPHONE (OUTPATIENT)
Dept: CARDIOLOGY | Facility: CLINIC | Age: 77
End: 2022-01-18
Payer: MEDICARE

## 2022-01-18 NOTE — TELEPHONE ENCOUNTER
----- Message from Pan Rodriges sent at 1/18/2022  2:24 PM CST -----  Name Of Caller: Angelina        Provider Name: Eddie Stubbs        Does patient feel the need to be seen today? no        Relationship to the Pt?: daughter        Contact Preference?: [phone]        What is the nature of the call?: patient's daughter would like to speak with someone in the regarding a virtual appointment that is scheduled for tomorrow 1- at 3pm

## 2022-01-18 NOTE — TELEPHONE ENCOUNTER
Returned call to pt's daughter    Reports her father's Daniela is on her phone and she may not be available to completed his visit tomorrow.    Stated that she will reach out to pt's NP who will be present for encounter tomorrow to see if she can log into his account and complete visit with him    Asked if face time was an option.  Explained to creat encounter pt will need to log into his account to begin visit. Verb understanding.

## 2022-01-19 ENCOUNTER — OFFICE VISIT (OUTPATIENT)
Dept: CARDIOLOGY | Facility: CLINIC | Age: 77
End: 2022-01-19
Payer: MEDICARE

## 2022-01-19 DIAGNOSIS — I63.9 CEREBROVASCULAR ACCIDENT (CVA), UNSPECIFIED MECHANISM: ICD-10-CM

## 2022-01-19 DIAGNOSIS — G30.9 ALZHEIMER'S DEMENTIA WITH BEHAVIORAL DISTURBANCE, UNSPECIFIED TIMING OF DEMENTIA ONSET: ICD-10-CM

## 2022-01-19 DIAGNOSIS — Z79.4 TYPE 2 DIABETES MELLITUS WITHOUT COMPLICATION, WITH LONG-TERM CURRENT USE OF INSULIN: ICD-10-CM

## 2022-01-19 DIAGNOSIS — I95.1 ORTHOSTATIC HYPOTENSION: Primary | ICD-10-CM

## 2022-01-19 DIAGNOSIS — F02.81 ALZHEIMER'S DEMENTIA WITH BEHAVIORAL DISTURBANCE, UNSPECIFIED TIMING OF DEMENTIA ONSET: ICD-10-CM

## 2022-01-19 DIAGNOSIS — E11.9 TYPE 2 DIABETES MELLITUS WITHOUT COMPLICATION, WITH LONG-TERM CURRENT USE OF INSULIN: ICD-10-CM

## 2022-01-19 DIAGNOSIS — Z51.5 PALLIATIVE CARE ENCOUNTER: ICD-10-CM

## 2022-01-19 PROBLEM — I82.522 CHRONIC DEEP VEIN THROMBOSIS (DVT) OF ILIAC VEIN OF LEFT LOWER EXTREMITY: Status: ACTIVE | Noted: 2021-08-13

## 2022-01-19 PROCEDURE — 99215 OFFICE O/P EST HI 40 MIN: CPT | Mod: 95,,, | Performed by: INTERNAL MEDICINE

## 2022-01-19 PROCEDURE — 99215 PR OFFICE/OUTPT VISIT, EST, LEVL V, 40-54 MIN: ICD-10-PCS | Mod: 95,,, | Performed by: INTERNAL MEDICINE

## 2022-01-19 RX ORDER — CITALOPRAM 10 MG/1
10 TABLET ORAL DAILY
Qty: 30 TABLET | Refills: 11
Start: 2022-01-19 | End: 2023-01-19

## 2022-01-19 NOTE — PATIENT INSTRUCTIONS
Patient Education       Low Blood Pressure   About this topic   Low blood pressure is also known as hypotension. It is when your blood pressure is lower than normal. Blood pressure measures the pressure in your arteries when the heart beats and pumps blood through your body. Your blood pressure has two numbers. The top number is the systolic number. It measures the highest amount of pressure in the artery when the heart beats. The second number or bottom number is the diastolic number. It is the lowest pressure in the artery when the heart rests.  Your blood pressure changes based on your activity level, health, and many other things. You may have low blood pressure all the time or it may be due to a drug or illness. Your care will be based on the reason for your low blood pressure.  What are the causes?   Your blood pressure may be low because of:  · Too much fluid loss or bleeding  · An infection  · An allergic reaction  · Drugs you are taking  · Your activity level or position  · Problems with your heart, nerves, thyroid, or blood sugar levels  · Pregnancy  · Your age  What can make this more likely to happen?   You are more likely to have low blood pressure if others in your family also have it. Low blood pressure is more common as you get older, or if you have a low body weight. Some kinds of drugs make you more likely to have problems with low blood pressure.  What are the main signs?   Some people who have low blood pressure all the time may have no signs at all. Others may:  · Feel dizzy or faint, especially when you sit or stand up  · Feel weak or tired  · Feel confused  · Have blurred vision  · Have an upset stomach  · Notice their heart pounding  · Not be able to pay attention or remember things  How does the doctor diagnose this health problem?   The doctor will ask you questions about your history and do an exam. The doctor will check your blood pressure and may ask you to sit, stand, and lie down while  it is checked. The doctor may order:  · Lab tests  · Tilt table test  · X-ray  · EKG  · Echocardiogram  · Exercise stress test  How does the doctor treat this health problem?   Your care will be based on what makes your blood pressure low. The doctor may work to help you deal with the signs of your low blood pressure.  What drugs may be needed?   The doctor may order drugs to:  · Raise your blood pressure  · Help some of the signs that make you sick or dizzy  Helpful tips   · Move slowly when you change positions. Take extra care when you move from sitting to standing or lying to sitting.  · Move your legs often if you need to sit or  one position for a long time.  · Do not stay in the sun for a long time.  · Long, hot baths may make your condition worse when you try to get up from the bathtub. So can going in a sauna or hot tub.  Where can I learn more?   NHS Choices  http://www.nhs.uk/Conditions/Blood-pressure-(low)/Pages/Causes.aspx   Last Reviewed Date   2020-03-25  Consumer Information Use and Disclaimer   This information is not specific medical advice and does not replace information you receive from your health care provider. This is only a brief summary of general information. It does NOT include all information about conditions, illnesses, injuries, tests, procedures, treatments, therapies, discharge instructions or life-style choices that may apply to you. You must talk with your health care provider for complete information about your health and treatment options. This information should not be used to decide whether or not to accept your health care providers advice, instructions or recommendations. Only your health care provider has the knowledge and training to provide advice that is right for you.  Copyright   Copyright © 2021 [employer], Inc. and its affiliates and/or licensors. All rights reserved.  Patient Education       Syncope (Fainting)   The Basics   Written by the doctors and editors  "at [employer]   What is syncope? -- "Syncope" is the medical term for fainting. After fainting, a person quickly "comes to" and is OK again. Syncope is very common. About 1 out of every 3 people has it at some point in life. In many cases, syncope is nothing to worry about.  What causes syncope? -- Syncope happens when the brain temporarily doesn't get enough blood. One of the most common reasons this happens is called "vasovagal syncope." If you have vasovagal syncope, your body has a reaction in which your heart beats too slowly or your blood vessels expand (or both). This can happen for lots of different kinds of reasons. People can have vasovagal syncope if they:  · Have stress from fear or pain (for example, because they are injured or have blood taken for tests)  · Stand for too long or are over-tired or overheated  · Have an unusual reaction to urinating, coughing, or other body functions  Sometimes vasovagal syncope happens with no clear cause.  People can also have syncope that is not vasovagal. This can happen due to the following problems:  · The heart beats too quickly or too slowly because of problems with the heart's electrical system or because of side effects from some medicines.  · Something blocks the flow of blood in the heart. This can happen in people who have conditions called "aortic stenosis" (a valve disease) or "hypertrophic cardiomyopathy" (a heart muscle disease).  · Your blood pressure drops when you stand or sit up. That can happen if you:  ? Do not drink enough water  ? Take certain medicines that cause your blood pressure to drop  ? Drink too much alcohol  ? Lose a lot of blood (for example, if you get hurt)  ? Have a medical condition that affects your blood pressure  Is syncope dangerous? -- In many cases it is not dangerous. But it can be dangerous if you fall and hurt yourself when you faint. It can also be dangerous if you faint while driving. To be safe, check with your doctor or " "nurse before you start driving again after you faint.  Should I see a doctor or nurse? -- Yes. Anyone who faints should see a doctor or nurse. Most cases of syncope are not serious. But people can get hurt when they faint. Plus, in some cases syncope is caused by a serious medical condition that should be treated. Knowing what caused you to faint can help you prevent it from happening again.  Tell your doctor or nurse what happened before, during, and after you fainted. If someone was with you when you fainted, that person might be able to tell you what happened. The following information is helpful:  · What were you doing before you passed out?  · How were you feeling before you passed out?  · How long were you passed out?  · How well did you recover?  · Any past history of fainting?  · A list of the medicines you take  · Any medical conditions you might have  Your doctor or nurse will ask you a few questions and do an exam. During the exam, the doctor or nurse might:  · Check your blood pressure and heart rate when you are lying down, sitting, or standing  · Listen to your heart to check whether something might be wrong with your heart valves or heart muscle  Will I need tests? -- Yes. Your doctor will do a test called an electrocardiogram (also called an "ECG"). For this test, the doctor will put sticky pads on your chest, belly, arms, and legs. Long, thin wires connect the pads to a machine (figure 1). The device records the electrical activity in your heart. This can show if the pattern of your heartbeats is abnormal.  You might get other tests, too. These could include 1 or more of the following:  · Echocardiogram (also called an "echo") - This test uses sound waves to create an image of the heart (figure 2). It allows the doctors to measure the walls and chambers of the heart, see how the heart is pumping and check how the heart valves are working. Heart valves are flaps of tissue that open and close like " swinging doors. They help keep blood moving in one direction.  · Carotid sinus massage - For this test, a doctor presses on a blood vessel in your neck (figure 3) while watching your electrocardiogram. This can show if your blood vessel is too sensitive to pressure.  · Home heart monitor - For home monitoring, you might wear or carry a device around at home. You will keep doing normal activities. One type of monitor records all your heart beats for 1 or 2 days (figure 4). With others, you push a button to record heart beats when you feel symptoms (figure 5). There is also a monitor you can wear in the form of a sticky patch that goes on your chest. It does not require separate wires or battery packs. You wear the patch for up to 14 days.  Can syncope be prevented? -- You might be able to reduce your chances of fainting again if you:  · Learn what causes your syncope:  ? If an activity or condition causes your syncope, you can try to avoid it.  ? If a medicine causes your syncope, your doctor can help you find an alternative.  ? If a heart condition is causing your syncope, your doctor can suggest a treatment.  · Lie down with your feet up when you feel like you might faint.  How is syncope treated? -- That depends on what is causing your syncope. In many cases, the main treatment is to avoid the situations that cause syncope.  In less common cases, other treatment might be needed. For example, you might need a pacemaker if your heart beats too slowly and this causes syncope. A pacemaker is a device that is put under your skin. Thin wires attach them to your heart. They help the heart beat at a normal speed or in a regular pattern.  What if my child faints? -- If your child faints, you should take them to see a doctor. Most cases of syncope in children are not serious. Often they are caused by vasovagal syncope. Syncope can also happen in children if:  · They hold their breath for too long  · Their blood pressure  "drops when they stand or sit up  · They swallow medicines, drugs, or alcohol  · They have carbon monoxide poisoning  In less common cases, syncope in children can be caused by a life-threatening condition, such as a serious heart condition, overheating, or a severe allergic reaction (called anaphylaxis).  All topics are updated as new evidence becomes available and our peer review process is complete.  This topic retrieved from [employer] on: Sep 21, 2021.  Topic 78408 Version 12.0  Release: 29.4.2 - C29.263  © 2021 UpToDate, Inc. and/or its affiliates. All rights reserved.  figure 1: Person having an ECG     This drawing shows a man having an ECG (also called an electrocardiogram or EKG). He has patches, called "electrodes," stuck onto his chest, arms, and legs. Wires run from the electrodes to the ECG machine. An ECG measures the electrical activity in the heart.  Graphic 16463 Version 2.0    figure 2: Transthoracic echocardiogram (echo)     This picture shows a person getting an echocardiogram (or "echo"). To do an echo, a doctor or nurse puts some gel on a person's chest. He or she presses a thick wand (called a "transducer") against the chest and moves it around. An echo uses sound waves to create images of the heart that appear on a computer screen. A test called an electrocardiogram (ECG) is done during an echo. For an ECG, patches (called "electrodes") are stuck to a person's chest. Wires run from the patches to a machine that records the electrical activity of the heart.  Graphic 60377 Version 4.0    figure 3: Carotid sinus     The carotid sinus is located in 1 of the arteries that brings blood to the brain. This artery is called the internal carotid artery.  Graphic 61793 Version 1.0    figure 4: Holter monitor     People with possible heart problems are sometimes asked to wear a device called a Holter monitor for 1 or 2 days. The device measures the electrical activity in the heart. It helps doctors " "pinpoint heart rhythm problems. You will have "electrodes" stuck to your chest that are connected to wires leading to the monitor. These electrodes tell the monitor how often your heart beats and if it has a normal rhythm. While you have a Holter monitor on, you should do your normal activities but keep the electrodes, wires, and device dry. Some people have an abnormal heart rhythm only during certain activities or certain times of the day.  Graphic 70843 Version 8.0    figure 5: Cardiac event recorder     An event recorder is a portable device patients can use to measure their heart rhythm for a short time. The patient must activate the recorder and hold it to the chest when they feel symptoms. It is useful for patients that have intermittent symptoms that may not be captured with other forms of testing.  Graphic 98034 Version 4.0    Consumer Information Use and Disclaimer   This information is not specific medical advice and does not replace information you receive from your health care provider. This is only a brief summary of general information. It does NOT include all information about conditions, illnesses, injuries, tests, procedures, treatments, therapies, discharge instructions or life-style choices that may apply to you. You must talk with your health care provider for complete information about your health and treatment options. This information should not be used to decide whether or not to accept your health care provider's advice, instructions or recommendations. Only your health care provider has the knowledge and training to provide advice that is right for you. The use of this information is governed by the [employer] End User License Agreement, available at [email or web address]/solutions/[email or web address]/about/antoni.The use of [employer] content is governed by the [employer] Terms of Use. ©2021 UpToDate, Inc. All rights reserved.  Copyright   © 2021 UpToDate, Inc. and/or its affiliates. All rights " reserved.

## 2022-01-19 NOTE — PROGRESS NOTES
Subjective:   Patient ID:  To Santos is a 76 y.o. male who presents for a virtual visit of orthostatic hypotension , Deep Vein Thrombosis, and Dementia      HPI:           The patient location is: home    Visit type: Virtual visit with synchronous audio and video  Total time spent with patient: 35 minutes of chart review, discussion, medications update, and charting.   Each patient to whom we provide medical services by telemedicine is:  (1) informed of the relationship between the physician and patient and the respective role of any other health care provider with respect to management of the patient; and (2) notified that he or she may decline to receive medical services by telemedicine and may withdraw from such care at any time.      Spoke with daughter, wife, and nurse practitioner         76-year-old male with a history of advanced dementia at home being care for by his wife, daughter, and nurse practitioner.  Patient was seen by Cardiology in August 2021 for a left lower extremity DVT.  He was started and discharged home on Eliquis from the emergency room.  On virtual visit today family had a question regarding recurrent syncopal event with positional changes.  Patient oral intake has significantly decreased both liquid and solid.  Of all the medications he is only down to for medications among others Seroquel 25 t.i.d., Eliquis 5 mg b.i.d., Celexa 10 mg daily and Tylenol 500 mg twice daily.  All other medications have been discontinued and patient with no longer take.    He was taken to the emergency room November 2021 for syncopal event when he went from supine to sitting sitting position.  His CT of the head was ordered as described below I revealed mild plaque carotids with moderate ostial right vertebral stenosis moderate to severe left vertebral stenosis with a patent basilar artery.  Blood pressure was 167/59 in the supine position.  No orthostatic vitals were performed during the  visit.        CTA 11/2021:    Extracranial carotid circulation: Minimal atherosclerosis of the bifurcations bilaterally.  No hemodynamically significant stenosis, aneurysmal dilatation, or dissection.     Extracranial vertebral circulation: At least moderate stenosis of the origin of the right vertebral artery.  Moderate-severe stenosis the origin left vertebral artery.  Moderate stenosis of the mid left vertebral artery.     Intracranial Arteries: Fusiform ectasia of the proximal basilar artery and distal basilar artery.  No stenosis or aneurysm of the anterior circulation.         Patient Active Problem List    Diagnosis Date Noted    Orthostatic hypotension 01/19/2022    Chronic deep vein thrombosis (DVT) of iliac vein of left lower extremity 08/13/2021    Hyperkalemia 08/13/2021    HÉCTOR (acute kidney injury) 08/13/2021    Sacral decubitus ulcer 08/13/2021    Dementia 08/13/2021    Palliative care encounter 08/13/2021    Goals of care, counseling/discussion 08/13/2021    Counseling regarding advance care planning and goals of care 08/13/2021    Transient ischemic attack (TIA) 12/01/2018    Type 2 diabetes mellitus, with long-term current use of insulin 12/01/2018    Obesity, unspecified 12/01/2018    Tissue plasminogen activator (t-PA) administered at other facility within 24 hours prior to current admission 12/01/2018     [facility] Regulatory Load October 2019      Cerebrovascular accident (CVA) 12/01/2018    Basal cell carcinoma 12/18/2013    Basal cell carcinoma, ear 11/27/2013            140/77 with         LABS      LAST HbA1c  Lab Results   Component Value Date    HGBA1C 9.0 (H) 12/01/2018       Lipid panel  Lab Results   Component Value Date    CHOL 172 12/01/2018    CHOL 189 12/01/2018     Lab Results   Component Value Date    HDL 39 (L) 12/01/2018    HDL 41 12/01/2018     Lab Results   Component Value Date    LDLCALC 104.8 12/01/2018    LDLCALC 118.2 12/01/2018     Lab Results   Component  Value Date    TRIG 141 12/01/2018    TRIG 149 12/01/2018     Lab Results   Component Value Date    CHOLHDL 22.7 12/01/2018    CHOLHDL 21.7 12/01/2018            Review of Systems   Unable to perform ROS: dementia       Objective:   Physical Exam      Limited PE in the setting of a virtual visit       Assessment:     1. Orthostatic hypotension    2. Cerebrovascular accident (CVA), unspecified mechanism    3. Alzheimer's dementia with behavioral disturbance, unspecified timing of dementia onset    4. Palliative care encounter    5. Type 2 diabetes mellitus without complication, with long-term current use of insulin        Plan:         Encourage increasing oral intake both liquid and solid.  Patient and family have ready discontinue all hypertensive agents.  Patient is not a candidate for any invasive procedure regarding his vertebral artery disease.  Continue with comfort measures as family has re-initiated.  A refill for Eliquis was sent to the pharmacy as patient will be prone to recurrent venous thromboembolic event for being bed-bound.  If patient can along the tolerate Eliquis will be discontinued.  The daughter, wife, a nurse practitioner at the bedside all expressed verbal understanding of the plan.      Follow up p.r.n.    Continue with current medical plan and lifestyle changes.  Return sooner for concerns or questions. If symptoms persist go to the ED  I have reviewed all pertinent data on this patient   I have reviewed the patient's medical history in detail and updated the computerized patient record.  Follow up as scheduled. Return sooner for concerns or questions          Patient's Medications   New Prescriptions    APIXABAN (ELIQUIS) 5 MG TAB    Take 1 tablet (5 mg total) by mouth 2 (two) times daily.    CITALOPRAM (CELEXA) 10 MG TABLET    Take 1 tablet (10 mg total) by mouth once daily.   Previous Medications    QUETIAPINE (SEROQUEL) 25 MG TAB    Take 25 mg by mouth every morning.    Modified  Medications

## 2023-07-28 ENCOUNTER — PATIENT MESSAGE (OUTPATIENT)
Dept: CARDIOLOGY | Facility: CLINIC | Age: 78
End: 2023-07-28
Payer: MEDICARE